# Patient Record
Sex: MALE | Race: WHITE | HISPANIC OR LATINO | Employment: UNEMPLOYED
[De-identification: names, ages, dates, MRNs, and addresses within clinical notes are randomized per-mention and may not be internally consistent; named-entity substitution may affect disease eponyms.]

---

## 2018-04-24 PROCEDURE — 88305 TISSUE EXAM BY PATHOLOGIST: CPT | Performed by: INTERNAL MEDICINE

## 2018-08-30 ENCOUNTER — CHARTING TRANS (OUTPATIENT)
Dept: OTHER | Age: 78
End: 2018-08-30

## 2018-10-11 ENCOUNTER — CHARTING TRANS (OUTPATIENT)
Dept: OTHER | Age: 78
End: 2018-10-11

## 2018-12-01 ENCOUNTER — PRIOR ORIGINAL RECORDS (OUTPATIENT)
Dept: HEALTH INFORMATION MANAGEMENT | Facility: OTHER | Age: 78
End: 2018-12-01

## 2018-12-08 VITALS
SYSTOLIC BLOOD PRESSURE: 124 MMHG | RESPIRATION RATE: 20 BRPM | HEART RATE: 62 BPM | DIASTOLIC BLOOD PRESSURE: 66 MMHG | WEIGHT: 120.38 LBS | TEMPERATURE: 97.6 F

## 2018-12-08 VITALS — TEMPERATURE: 97.5 F

## 2019-01-05 ENCOUNTER — TELEPHONE (OUTPATIENT)
Dept: SCHEDULING | Age: 79
End: 2019-01-05

## 2019-01-05 ENCOUNTER — OFFICE VISIT (OUTPATIENT)
Dept: INTERNAL MEDICINE | Age: 79
End: 2019-01-05

## 2019-01-05 DIAGNOSIS — Z90.3 HISTORY OF GASTRECTOMY: ICD-10-CM

## 2019-01-05 DIAGNOSIS — G30.9 ALZHEIMER'S DEMENTIA WITHOUT BEHAVIORAL DISTURBANCE, UNSPECIFIED TIMING OF DEMENTIA ONSET: ICD-10-CM

## 2019-01-05 DIAGNOSIS — R07.89 ANTERIOR CHEST WALL PAIN: Primary | ICD-10-CM

## 2019-01-05 DIAGNOSIS — F02.80 ALZHEIMER'S DEMENTIA WITHOUT BEHAVIORAL DISTURBANCE, UNSPECIFIED TIMING OF DEMENTIA ONSET: ICD-10-CM

## 2019-01-05 PROCEDURE — 99204 OFFICE O/P NEW MOD 45 MIN: CPT | Performed by: INTERNAL MEDICINE

## 2019-01-05 RX ORDER — MEMANTINE HYDROCHLORIDE 10 MG/1
10 TABLET ORAL 2 TIMES DAILY
COMMUNITY

## 2019-01-05 RX ORDER — DICLOFENAC SODIUM 75 MG/1
75 TABLET, DELAYED RELEASE ORAL 2 TIMES DAILY
Qty: 28 TABLET | Refills: 1 | Status: SHIPPED | OUTPATIENT
Start: 2019-01-05 | End: 2019-02-06 | Stop reason: SDUPTHER

## 2019-01-05 RX ORDER — GALANTAMINE HYDROBROMIDE 16 MG/1
16 CAPSULE, EXTENDED RELEASE ORAL
COMMUNITY

## 2019-01-05 ASSESSMENT — ENCOUNTER SYMPTOMS
RHINORRHEA: 0
DIZZINESS: 1
NAUSEA: 0
CONFUSION: 1
WOUND: 0
CHILLS: 0
SHORTNESS OF BREATH: 0
SORE THROAT: 0
NERVOUS/ANXIOUS: 0
ABDOMINAL PAIN: 0
WHEEZING: 0
FEVER: 0
BACK PAIN: 0
HEADACHES: 0
SLEEP DISTURBANCE: 0
POLYDIPSIA: 0
APPETITE CHANGE: 0
CONSTIPATION: 0
BRUISES/BLEEDS EASILY: 0
TROUBLE SWALLOWING: 0
FATIGUE: 0
VOMITING: 0
COUGH: 0
DIARRHEA: 0
BLOOD IN STOOL: 0

## 2019-01-05 ASSESSMENT — PAIN SCALES - GENERAL: PAINLEVEL: 0

## 2019-02-05 ENCOUNTER — TELEPHONE (OUTPATIENT)
Dept: SCHEDULING | Age: 79
End: 2019-02-05

## 2019-02-06 ENCOUNTER — HOSPITAL (OUTPATIENT)
Dept: OTHER | Age: 79
End: 2019-02-06
Attending: INTERNAL MEDICINE

## 2019-02-06 ENCOUNTER — OFFICE VISIT (OUTPATIENT)
Dept: INTERNAL MEDICINE | Age: 79
End: 2019-02-06

## 2019-02-06 VITALS
TEMPERATURE: 97.4 F | SYSTOLIC BLOOD PRESSURE: 110 MMHG | HEART RATE: 60 BPM | BODY MASS INDEX: 18.25 KG/M2 | HEIGHT: 69 IN | WEIGHT: 123.24 LBS | DIASTOLIC BLOOD PRESSURE: 70 MMHG | OXYGEN SATURATION: 96 %

## 2019-02-06 DIAGNOSIS — M25.511 ARTHRALGIA OF RIGHT ACROMIOCLAVICULAR JOINT: Primary | ICD-10-CM

## 2019-02-06 PROCEDURE — 99214 OFFICE O/P EST MOD 30 MIN: CPT | Performed by: INTERNAL MEDICINE

## 2019-02-06 RX ORDER — DICLOFENAC SODIUM 75 MG/1
75 TABLET, DELAYED RELEASE ORAL 2 TIMES DAILY
Qty: 28 TABLET | Refills: 1 | Status: SHIPPED | OUTPATIENT
Start: 2019-02-06

## 2019-02-06 RX ORDER — VITAMIN E 268 MG
400 CAPSULE ORAL DAILY
COMMUNITY

## 2019-02-06 RX ORDER — TURMERIC 400 MG
CAPSULE ORAL
COMMUNITY
End: 2019-06-06 | Stop reason: ALTCHOICE

## 2019-02-06 ASSESSMENT — ENCOUNTER SYMPTOMS
ABDOMINAL PAIN: 0
FEVER: 0
CHEST TIGHTNESS: 0

## 2019-03-09 ENCOUNTER — DIAGNOSTIC TRANS (OUTPATIENT)
Dept: OTHER | Age: 79
End: 2019-03-09

## 2019-03-10 ENCOUNTER — HOSPITAL (OUTPATIENT)
Dept: OTHER | Age: 79
End: 2019-03-10
Attending: INTERNAL MEDICINE

## 2019-03-10 ENCOUNTER — HOSPITAL (OUTPATIENT)
Dept: OTHER | Age: 79
End: 2019-03-10

## 2019-03-10 LAB
2009 H1N1 SUBTYPE (RF1N1): NOT DETECTED
ADENOVIRUS (RADENO): NOT DETECTED
ALBUMIN SERPL-MCNC: 3.6 GM/DL (ref 3.6–5.1)
ALP SERPL-CCNC: 79 UNIT/L (ref 45–117)
ALT SERPL-CCNC: 30 UNIT/L
AMORPH SED URNS QL MICRO: ABNORMAL
ANALYZER ANC (IANC): ABNORMAL
ANION GAP SERPL CALC-SCNC: 14 MMOL/L (ref 10–20)
APPEARANCE UR: CLEAR
AST SERPL-CCNC: 44 UNIT/L
BACTERIA #/AREA URNS HPF: ABNORMAL /HPF
BASOPHILS # BLD: 0 THOUSAND/MCL (ref 0–0.3)
BASOPHILS NFR BLD: 0 %
BILIRUB CONJ SERPL-MCNC: 0.1 MG/DL (ref 0–0.2)
BILIRUB SERPL-MCNC: 0.5 MG/DL (ref 0.2–1)
BILIRUB UR QL: NEGATIVE
BOCAVIRUS (RBOCA): NOT DETECTED
BUN SERPL-MCNC: 20 MG/DL (ref 6–20)
BUN/CREAT SERPL: 21 (ref 7–25)
C. PNEUMONIAE (RCHLP): NOT DETECTED
CALCIUM SERPL-MCNC: 8.9 MG/DL (ref 8.4–10.2)
CAOX CRY URNS QL MICRO: ABNORMAL
CHLORIDE: 106 MMOL/L (ref 98–107)
CO2 SERPL-SCNC: 27 MMOL/L (ref 21–32)
COLOR UR: YELLOW
CORONAVIRUS 229E (RC229E): NOT DETECTED
CORONAVIRUS HKU1 (RCHKU1): NOT DETECTED
CORONAVIRUS NL63 (RCNL63): NOT DETECTED
CORONAVIRUS OC43 (RCO43): NOT DETECTED
CREAT SERPL-MCNC: 0.96 MG/DL (ref 0.67–1.17)
DIFFERENTIAL METHOD BLD: ABNORMAL
EOSINOPHIL # BLD: 0 THOUSAND/MCL (ref 0.1–0.5)
EOSINOPHIL NFR BLD: 0 %
EPITH CASTS #/AREA URNS LPF: ABNORMAL /[LPF]
ERYTHROCYTE [DISTWIDTH] IN BLOOD: 12.2 % (ref 11–15)
FATTY CASTS #/AREA URNS LPF: ABNORMAL /[LPF]
GLUCOSE SERPL-MCNC: 86 MG/DL (ref 65–99)
GLUCOSE UR-MCNC: NEGATIVE MG/DL
GRAN CASTS #/AREA URNS LPF: ABNORMAL /[LPF]
HEMATOCRIT: 42.1 % (ref 39–51)
HGB BLD-MCNC: 14.2 GM/DL (ref 13–17)
HGB UR QL: NEGATIVE
HYALINE CASTS #/AREA URNS LPF: ABNORMAL /LPF (ref 0–5)
IMM GRANULOCYTES # BLD AUTO: 0 THOUSAND/MCL (ref 0–0.2)
IMM GRANULOCYTES NFR BLD: 0 %
INFLUENZA A SUBTYPE H1 (RFLH1): NOT DETECTED
INFLUENZA A SUBTYPE H3 (RFLH3): POSITIVE
INFLUENZA A UNSUBTYPABLE (RIAU): ABNORMAL
INFLUENZA B VIRUS (XFLUB): NOT DETECTED
KETONES UR-MCNC: 20 MG/DL
LEUKOCYTE ESTERASE UR QL STRIP: NEGATIVE
LIPASE SERPL-CCNC: 183 UNIT/L (ref 73–393)
LYMPHOCYTES # BLD: 1.4 THOUSAND/MCL (ref 1–4)
LYMPHOCYTES NFR BLD: 20 %
M. PNEUMONIAE (RMYPP): NOT DETECTED
MAGNESIUM SERPL-MCNC: 2.1 MG/DL (ref 1.7–2.4)
MCH RBC QN AUTO: 32.3 PG (ref 26–34)
MCHC RBC AUTO-ENTMCNC: 33.7 GM/DL (ref 32–36.5)
MCV RBC AUTO: 95.9 FL (ref 78–100)
METAPNEUMOVIRUS (RMETA): NOT DETECTED
MIXED CELL CASTS #/AREA URNS LPF: ABNORMAL /[LPF]
MONOCYTES # BLD: 0.5 THOUSAND/MCL (ref 0.3–0.9)
MONOCYTES NFR BLD: 7 %
MUCOUS THREADS URNS QL MICRO: PRESENT
NEUTROPHILS # BLD: 5.3 THOUSAND/MCL (ref 1.8–7.7)
NEUTROPHILS NFR BLD: 73 %
NEUTS SEG NFR BLD: ABNORMAL %
NITRITE UR QL: NEGATIVE
NRBC (NRBCRE): 0 /100 WBC
PARAINFLUENZA, TYPE 1 (RPAR1): NOT DETECTED
PARAINFLUENZA, TYPE 2 (RPAR2): NOT DETECTED
PARAINFLUENZA, TYPE 3 (RPAR3): NOT DETECTED
PARAINFLUENZA, TYPE 4 (RPAR4): NOT DETECTED
PH UR: 6 UNIT (ref 5–7)
PLATELET # BLD: 135 THOUSAND/MCL (ref 140–450)
POTASSIUM SERPL-SCNC: 3.7 MMOL/L (ref 3.4–5.1)
PROCALCITONIN SERPL IA-MCNC: <0.05 NG/ML
PROT SERPL-MCNC: 7.6 GM/DL (ref 6.4–8.2)
PROT UR QL: NEGATIVE MG/DL
RBC # BLD: 4.39 MILLION/MCL (ref 4.5–5.9)
RBC #/AREA URNS HPF: ABNORMAL /HPF (ref 0–3)
RBC CASTS #/AREA URNS LPF: ABNORMAL /[LPF]
RENAL EPI CELLS #/AREA URNS HPF: ABNORMAL /[HPF]
RHINOVIRUS/ENTEROVIRUS (RRHINO): NOT DETECTED
RSV, SUBTYPE A (RRSVA): NOT DETECTED
RSV, SUBTYPE B (RRSVB): NOT DETECTED
SODIUM SERPL-SCNC: 143 MMOL/L (ref 135–145)
SP GR UR: 1.03 (ref 1–1.03)
SPECIMEN SOURCE: ABNORMAL
SPECIMEN SOURCE: ABNORMAL
SPERM URNS QL MICRO: ABNORMAL
SQUAMOUS #/AREA URNS HPF: ABNORMAL /HPF (ref 0–5)
T VAGINALIS URNS QL MICRO: ABNORMAL
TRI-PHOS CRY URNS QL MICRO: ABNORMAL
TROPONIN I SERPL HS-MCNC: <0.02 NG/ML
TSH SERPL-ACNC: 1.94 MCUNIT/ML (ref 0.35–5)
URATE CRY URNS QL MICRO: ABNORMAL
URINE REFLEX: ABNORMAL
URNS CMNT MICRO: ABNORMAL
UROBILINOGEN UR QL: 0.2 MG/DL (ref 0–1)
WAXY CASTS #/AREA URNS LPF: ABNORMAL /[LPF]
WBC # BLD: 7.3 THOUSAND/MCL (ref 4.2–11)
WBC #/AREA URNS HPF: ABNORMAL /HPF (ref 0–5)
WBC CASTS #/AREA URNS LPF: ABNORMAL /[LPF]
YEAST HYPHAE URNS QL MICRO: ABNORMAL
YEAST URNS QL MICRO: ABNORMAL

## 2019-03-11 LAB
CREAT SERPL-MCNC: 0.85 MG/DL (ref 0.67–1.17)
MAGNESIUM SERPL-MCNC: 1.8 MG/DL (ref 1.7–2.4)
POTASSIUM SERPL-SCNC: 3.7 MMOL/L (ref 3.4–5.1)

## 2019-03-13 LAB
CREAT SERPL-MCNC: 0.83 MG/DL (ref 0.67–1.17)
FOLATE SERPL-MCNC: 9.7 NG/ML
IRON SATN MFR SERPL: 25 % (ref 15–45)
IRON SERPL-MCNC: 55 MCG/DL (ref 65–175)
POTASSIUM SERPL-SCNC: 3.6 MMOL/L (ref 3.4–5.1)
TIBC SERPL-MCNC: 224 MCG/DL (ref 250–450)
VIT B12 SERPL-MCNC: 432 PG/ML (ref 211–911)

## 2019-03-14 LAB
CREAT SERPL-MCNC: 0.95 MG/DL (ref 0.67–1.17)
POTASSIUM SERPL-SCNC: 3.8 MMOL/L (ref 3.4–5.1)

## 2019-03-15 ENCOUNTER — TELEPHONE (OUTPATIENT)
Dept: SCHEDULING | Age: 79
End: 2019-03-15

## 2019-06-05 ENCOUNTER — TELEPHONE (OUTPATIENT)
Dept: SCHEDULING | Age: 79
End: 2019-06-05

## 2019-06-06 ENCOUNTER — OFFICE VISIT (OUTPATIENT)
Dept: INTERNAL MEDICINE | Age: 79
End: 2019-06-06

## 2019-06-06 DIAGNOSIS — G47.62 NOCTURNAL LEG CRAMPS: ICD-10-CM

## 2019-06-06 DIAGNOSIS — R42 DIZZINESS: Primary | ICD-10-CM

## 2019-06-06 PROCEDURE — 99214 OFFICE O/P EST MOD 30 MIN: CPT | Performed by: INTERNAL MEDICINE

## 2019-06-06 RX ORDER — MECLIZINE HCL 12.5 MG/1
12.5 TABLET ORAL 3 TIMES DAILY PRN
Qty: 30 TABLET | Refills: 2 | Status: SHIPPED | OUTPATIENT
Start: 2019-06-06 | End: 2019-07-11 | Stop reason: SDUPTHER

## 2019-06-06 ASSESSMENT — ENCOUNTER SYMPTOMS
DIARRHEA: 0
APPETITE CHANGE: 0
COUGH: 0
NAUSEA: 0
VOMITING: 0
UNEXPECTED WEIGHT CHANGE: 0
SLEEP DISTURBANCE: 0
BACK PAIN: 0
ABDOMINAL DISTENTION: 0
CHILLS: 0
HEADACHES: 0
DIZZINESS: 1
SHORTNESS OF BREATH: 0
CONSTIPATION: 0
FATIGUE: 0
BLOOD IN STOOL: 0
FEVER: 0

## 2019-06-06 ASSESSMENT — PAIN SCALES - GENERAL: PAINLEVEL: 9-10

## 2019-07-08 ENCOUNTER — HOSPITAL (OUTPATIENT)
Dept: OTHER | Age: 79
End: 2019-07-08

## 2019-07-08 ENCOUNTER — DIAGNOSTIC TRANS (OUTPATIENT)
Dept: OTHER | Age: 79
End: 2019-07-08

## 2019-07-08 LAB
ANALYZER ANC (IANC): ABNORMAL
ANION GAP SERPL CALC-SCNC: 7 MMOL/L (ref 10–20)
BASOPHILS # BLD: 0 K/MCL (ref 0–0.3)
BASOPHILS NFR BLD: 0 %
BUN SERPL-MCNC: 16 MG/DL (ref 6–20)
BUN/CREAT SERPL: 15 (ref 7–25)
CALCIUM SERPL-MCNC: 9.1 MG/DL (ref 8.4–10.2)
CHLORIDE SERPL-SCNC: 109 MMOL/L (ref 98–107)
CO2 SERPL-SCNC: 30 MMOL/L (ref 21–32)
CREAT SERPL-MCNC: 1.07 MG/DL (ref 0.67–1.17)
DIFFERENTIAL METHOD BLD: ABNORMAL
EOSINOPHIL # BLD: 0.1 K/MCL (ref 0.1–0.5)
EOSINOPHIL NFR BLD: 1 %
ERYTHROCYTE [DISTWIDTH] IN BLOOD: 12.8 % (ref 11–15)
GLUCOSE SERPL-MCNC: 88 MG/DL (ref 65–99)
HCT VFR BLD CALC: 41.7 % (ref 39–51)
HGB BLD-MCNC: 14 G/DL (ref 13–17)
IMM GRANULOCYTES # BLD AUTO: 0 K/MCL (ref 0–0.2)
IMM GRANULOCYTES NFR BLD: 0 %
LYMPHOCYTES # BLD: 2.2 K/MCL (ref 1–4)
LYMPHOCYTES NFR BLD: 38 %
MCH RBC QN AUTO: 32.9 PG (ref 26–34)
MCHC RBC AUTO-ENTMCNC: 33.6 G/DL (ref 32–36.5)
MCV RBC AUTO: 97.9 FL (ref 78–100)
MONOCYTES # BLD: 0.4 K/MCL (ref 0.3–0.9)
MONOCYTES NFR BLD: 7 %
NEUTROPHILS # BLD: 3.1 K/MCL (ref 1.8–7.7)
NEUTROPHILS NFR BLD: 54 %
NEUTS SEG NFR BLD: ABNORMAL %
NRBC (NRBCRE): 0 /100 WBC
PLATELET # BLD: 164 K/MCL (ref 140–450)
POTASSIUM SERPL-SCNC: 3.9 MMOL/L (ref 3.4–5.1)
POTASSIUM SERPL-SCNC: ABNORMAL MMOL/L
RBC # BLD: 4.26 MIL/MCL (ref 4.5–5.9)
SODIUM SERPL-SCNC: 142 MMOL/L (ref 135–145)
TROPONIN I SERPL HS-MCNC: <0.02 NG/ML
WBC # BLD: 5.7 K/MCL (ref 4.2–11)

## 2019-07-11 DIAGNOSIS — R42 DIZZINESS: ICD-10-CM

## 2019-07-11 RX ORDER — MECLIZINE HCL 12.5 MG/1
TABLET ORAL
Qty: 30 TABLET | Refills: 0 | Status: SHIPPED | OUTPATIENT
Start: 2019-07-11 | End: 2019-07-23 | Stop reason: SDUPTHER

## 2019-07-15 ENCOUNTER — HOSPITAL ENCOUNTER (EMERGENCY)
Facility: HOSPITAL | Age: 79
Discharge: LEFT BEFORE DISCHARGE | End: 2019-07-15
Payer: COMMERCIAL

## 2019-07-23 DIAGNOSIS — R42 DIZZINESS: ICD-10-CM

## 2019-07-23 RX ORDER — MECLIZINE HCL 12.5 MG/1
TABLET ORAL
Qty: 30 TABLET | Refills: 0 | Status: SHIPPED | OUTPATIENT
Start: 2019-07-23

## 2020-02-23 ENCOUNTER — HOSPITAL ENCOUNTER (OUTPATIENT)
Facility: HOSPITAL | Age: 80
Setting detail: OBSERVATION
Discharge: HOME | End: 2020-02-24
Attending: EMERGENCY MEDICINE | Admitting: HOSPITALIST
Payer: COMMERCIAL

## 2020-02-23 ENCOUNTER — APPOINTMENT (OUTPATIENT)
Dept: CT IMAGING | Facility: HOSPITAL | Age: 80
End: 2020-02-23
Attending: EMERGENCY MEDICINE
Payer: COMMERCIAL

## 2020-02-23 DIAGNOSIS — R42 DIZZINESS: Primary | ICD-10-CM

## 2020-02-23 DIAGNOSIS — R00.1 BRADYCARDIA: ICD-10-CM

## 2020-02-23 PROBLEM — D69.6 THROMBOCYTOPENIA (HCC): Status: ACTIVE | Noted: 2020-02-23

## 2020-02-23 PROBLEM — R73.9 HYPERGLYCEMIA: Status: ACTIVE | Noted: 2020-02-23

## 2020-02-23 LAB
ALBUMIN SERPL-MCNC: 3.4 G/DL (ref 3.4–5)
ALBUMIN/GLOB SERPL: 1 {RATIO} (ref 1–2)
ALP LIVER SERPL-CCNC: 76 U/L (ref 45–117)
ALT SERPL-CCNC: 31 U/L (ref 16–61)
ANION GAP SERPL CALC-SCNC: 4 MMOL/L (ref 0–18)
AST SERPL-CCNC: 26 U/L (ref 15–37)
ATRIAL RATE: 49 BPM
BASOPHILS # BLD AUTO: 0.01 X10(3) UL (ref 0–0.2)
BASOPHILS NFR BLD AUTO: 0.2 %
BILIRUB SERPL-MCNC: 0.6 MG/DL (ref 0.1–2)
BUN BLD-MCNC: 17 MG/DL (ref 7–18)
BUN/CREAT SERPL: 16.7 (ref 10–20)
CALCIUM BLD-MCNC: 8.6 MG/DL (ref 8.5–10.1)
CHLORIDE SERPL-SCNC: 110 MMOL/L (ref 98–112)
CO2 SERPL-SCNC: 26 MMOL/L (ref 21–32)
CREAT BLD-MCNC: 1.02 MG/DL (ref 0.7–1.3)
DEPRECATED RDW RBC AUTO: 44.8 FL (ref 35.1–46.3)
EOSINOPHIL # BLD AUTO: 0.03 X10(3) UL (ref 0–0.7)
EOSINOPHIL NFR BLD AUTO: 0.6 %
ERYTHROCYTE [DISTWIDTH] IN BLOOD BY AUTOMATED COUNT: 12.5 % (ref 11–15)
GLOBULIN PLAS-MCNC: 3.4 G/DL (ref 2.8–4.4)
GLUCOSE BLD-MCNC: 132 MG/DL (ref 70–99)
GLUCOSE BLD-MCNC: 92 MG/DL (ref 70–99)
HAV IGM SER QL: 2.1 MG/DL (ref 1.6–2.6)
HCT VFR BLD AUTO: 38.5 % (ref 39–53)
HGB BLD-MCNC: 12.6 G/DL (ref 13–17.5)
IMM GRANULOCYTES # BLD AUTO: 0.01 X10(3) UL (ref 0–1)
IMM GRANULOCYTES NFR BLD: 0.2 %
LYMPHOCYTES # BLD AUTO: 1.04 X10(3) UL (ref 1–4)
LYMPHOCYTES NFR BLD AUTO: 21.9 %
M PROTEIN MFR SERPL ELPH: 6.8 G/DL (ref 6.4–8.2)
MCH RBC QN AUTO: 32 PG (ref 26–34)
MCHC RBC AUTO-ENTMCNC: 32.7 G/DL (ref 31–37)
MCV RBC AUTO: 97.7 FL (ref 80–100)
MONOCYTES # BLD AUTO: 0.23 X10(3) UL (ref 0.1–1)
MONOCYTES NFR BLD AUTO: 4.8 %
NEUTROPHILS # BLD AUTO: 3.43 X10 (3) UL (ref 1.5–7.7)
NEUTROPHILS # BLD AUTO: 3.43 X10(3) UL (ref 1.5–7.7)
NEUTROPHILS NFR BLD AUTO: 72.3 %
OSMOLALITY SERPL CALC.SUM OF ELEC: 293 MOSM/KG (ref 275–295)
P AXIS: 82 DEGREES
P-R INTERVAL: 162 MS
PLATELET # BLD AUTO: 139 10(3)UL (ref 150–450)
POTASSIUM SERPL-SCNC: 4 MMOL/L (ref 3.5–5.1)
Q-T INTERVAL: 466 MS
QRS DURATION: 84 MS
QTC CALCULATION (BEZET): 420 MS
R AXIS: 66 DEGREES
RBC # BLD AUTO: 3.94 X10(6)UL (ref 3.8–5.8)
SODIUM SERPL-SCNC: 140 MMOL/L (ref 136–145)
T AXIS: 82 DEGREES
TROPONIN I SERPL-MCNC: <0.045 NG/ML (ref ?–0.04)
TSI SER-ACNC: 1.08 MIU/ML (ref 0.36–3.74)
VENTRICULAR RATE: 49 BPM
WBC # BLD AUTO: 4.8 X10(3) UL (ref 4–11)

## 2020-02-23 PROCEDURE — 99220 INITIAL OBSERVATION CARE,LEVL III: CPT | Performed by: HOSPITALIST

## 2020-02-23 PROCEDURE — 70450 CT HEAD/BRAIN W/O DYE: CPT | Performed by: EMERGENCY MEDICINE

## 2020-02-23 PROCEDURE — 72125 CT NECK SPINE W/O DYE: CPT | Performed by: EMERGENCY MEDICINE

## 2020-02-23 RX ORDER — SODIUM CHLORIDE 9 MG/ML
INJECTION, SOLUTION INTRAVENOUS CONTINUOUS
Status: DISCONTINUED | OUTPATIENT
Start: 2020-02-23 | End: 2020-02-23

## 2020-02-23 RX ORDER — ONDANSETRON 2 MG/ML
4 INJECTION INTRAMUSCULAR; INTRAVENOUS EVERY 6 HOURS PRN
Status: DISCONTINUED | OUTPATIENT
Start: 2020-02-23 | End: 2020-02-24

## 2020-02-23 RX ORDER — ACETAMINOPHEN 325 MG/1
650 TABLET ORAL EVERY 6 HOURS PRN
Status: DISCONTINUED | OUTPATIENT
Start: 2020-02-23 | End: 2020-02-23

## 2020-02-23 RX ORDER — SODIUM CHLORIDE 9 MG/ML
INJECTION, SOLUTION INTRAVENOUS ONCE
Status: COMPLETED | OUTPATIENT
Start: 2020-02-23 | End: 2020-02-24

## 2020-02-23 RX ORDER — GALANTAMINE HYDROBROMIDE 8 MG/1
8 TABLET, FILM COATED ORAL 2 TIMES DAILY WITH MEALS
Status: DISCONTINUED | OUTPATIENT
Start: 2020-02-23 | End: 2020-02-24

## 2020-02-23 RX ORDER — MEMANTINE HYDROCHLORIDE 10 MG/1
10 TABLET ORAL 2 TIMES DAILY
COMMUNITY
End: 2021-11-03

## 2020-02-23 RX ORDER — METOCLOPRAMIDE HYDROCHLORIDE 5 MG/ML
5 INJECTION INTRAMUSCULAR; INTRAVENOUS EVERY 8 HOURS PRN
Status: DISCONTINUED | OUTPATIENT
Start: 2020-02-23 | End: 2020-02-24

## 2020-02-23 RX ORDER — GALANTAMINE HYDROBROMIDE 16 MG/1
16 CAPSULE, EXTENDED RELEASE ORAL DAILY
Status: ON HOLD | COMMUNITY
End: 2020-02-24

## 2020-02-23 RX ORDER — MEMANTINE HYDROCHLORIDE 10 MG/1
10 TABLET ORAL 2 TIMES DAILY
Status: DISCONTINUED | OUTPATIENT
Start: 2020-02-23 | End: 2020-02-24

## 2020-02-23 RX ORDER — MECLIZINE HCL 12.5 MG/1
12.5 TABLET ORAL EVERY 6 HOURS PRN
Status: DISCONTINUED | OUTPATIENT
Start: 2020-02-23 | End: 2020-02-24

## 2020-02-23 RX ORDER — ACETAMINOPHEN 325 MG/1
650 TABLET ORAL EVERY 6 HOURS PRN
Status: DISCONTINUED | OUTPATIENT
Start: 2020-02-23 | End: 2020-02-24

## 2020-02-23 RX ORDER — ASPIRIN 81 MG/1
324 TABLET, CHEWABLE ORAL ONCE
Status: COMPLETED | OUTPATIENT
Start: 2020-02-23 | End: 2020-02-23

## 2020-02-23 RX ORDER — MELATONIN
325
Status: DISCONTINUED | OUTPATIENT
Start: 2020-02-24 | End: 2020-02-24

## 2020-02-23 NOTE — H&P
JULIUS HOSPITALIST  History and Physical     America Pals Patient Status:  Emergency    1940 MRN [id number]   Location [facility] Attending Yanni Vieyra MD   Hosp Day # 0 PCP Mohini Krueger MD     Chief Complaint: Allergies: No Known Allergies    Medications:  No current facility-administered medications on file prior to encounter. Galantamine Hydrobromide ER 16 MG Oral Capsule SR 24 Hr, Take 16 mg by mouth daily. , Disp: , Rfl:   Memantine HCl 10 MG Oral Tab Clearance: 43.1 mL/min (based on SCr of 1.02 mg/dL). No results for input(s): PTP, INR in the last 168 hours. Recent Labs   Lab 02/23/20  1341   TROP <0.045       Imaging: Imaging data reviewed in Epic. ASSESSMENT / PLAN:     1. Near syncope  1.

## 2020-02-23 NOTE — ED PROVIDER NOTES
Patient Seen in: [facility] Emergency Department      History   Patient presents with:  Dizziness    Stated Complaint: dizziness    HPI    78-year-old with a history of Alzheimer's disease, previous gastric cancer status post gastrectomy, chronic an POLYPECTOMY 03241 N/A 4/24/2018    Performed by Quincy Burr MD at [facility]   • COLONOSCOPY, POSSIBLE BIOPSY, POSSIBLE POLYPECTOMY 90632 N/A 4/16/2018    Performed by Quincy Burr MD at [address] [place] [address],[facility] (14) - Abnormal; Notable for the following components:       Result Value    Glucose 132 (*)     All other components within normal limits   CBC W/ DIFFERENTIAL - Abnormal; Notable for the following components:    HGB 12.6 (*)     HCT 38.5 (*)     . Discharge Medication List                   Present on Admission           ICD-10-CM Noted POA    * (Principal) Dizziness R42 2/23/2020 Unknown    Bradycardia R00.1 2/23/2020     Dehydration E86.0 Unknown Unknown    Hyperglycemia R73.9 2/23/2020 Yes    Thr
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
## 2020-02-24 ENCOUNTER — APPOINTMENT (OUTPATIENT)
Dept: MRI IMAGING | Facility: HOSPITAL | Age: 80
End: 2020-02-24
Attending: HOSPITALIST
Payer: COMMERCIAL

## 2020-02-24 VITALS
TEMPERATURE: 98 F | HEART RATE: 50 BPM | SYSTOLIC BLOOD PRESSURE: 103 MMHG | WEIGHT: 112.44 LBS | DIASTOLIC BLOOD PRESSURE: 59 MMHG | RESPIRATION RATE: 18 BRPM | BODY MASS INDEX: 16 KG/M2 | OXYGEN SATURATION: 97 %

## 2020-02-24 PROCEDURE — 99217 OBSERVATION CARE DISCHARGE: CPT | Performed by: HOSPITALIST

## 2020-02-24 PROCEDURE — 70546 MR ANGIOGRAPH HEAD W/O&W/DYE: CPT | Performed by: HOSPITALIST

## 2020-02-24 PROCEDURE — 70549 MR ANGIOGRAPH NECK W/O&W/DYE: CPT | Performed by: HOSPITALIST

## 2020-02-24 PROCEDURE — 70553 MRI BRAIN STEM W/O & W/DYE: CPT | Performed by: HOSPITALIST

## 2020-02-24 RX ORDER — ASPIRIN 81 MG/1
81 TABLET, CHEWABLE ORAL NIGHTLY
Refills: 0 | Status: SHIPPED | COMMUNITY
Start: 2020-02-24

## 2020-02-24 RX ORDER — MECLIZINE HCL 12.5 MG/1
12.5 TABLET ORAL EVERY 6 HOURS PRN
Qty: 20 TABLET | Refills: 0 | Status: SHIPPED | OUTPATIENT
Start: 2020-02-24

## 2020-02-24 RX ORDER — GALANTAMINE HYDROBROMIDE 8 MG/1
16 CAPSULE, EXTENDED RELEASE ORAL
Qty: 30 CAPSULE | Refills: 0 | Status: SHIPPED | COMMUNITY
Start: 2020-02-24 | End: 2021-11-03

## 2020-02-24 RX ORDER — GALANTAMINE HYDROBROMIDE 4 MG/1
4 TABLET, FILM COATED ORAL 2 TIMES DAILY WITH MEALS
Status: DISCONTINUED | OUTPATIENT
Start: 2020-02-24 | End: 2020-02-24

## 2020-02-24 RX ORDER — GALANTAMINE HYDROBROMIDE 8 MG/1
8 CAPSULE, EXTENDED RELEASE ORAL
Qty: 30 CAPSULE | Refills: 0 | Status: SHIPPED | OUTPATIENT
Start: 2020-02-24 | End: 2020-02-24

## 2020-02-24 NOTE — PHYSICAL THERAPY NOTE
PHYSICAL THERAPY QUICK EVALUATION - INPATIENT    Room Number: 8295/9472-H  Evaluation Date: 2/24/2020  Presenting Problem: dizziness, bradycardia  Physician Order: PT Eval and Treat     History related to current admission: Pt is 80 yo male admitted with 12  Railing: Yes    Lives With: Spouse  Drives: No  Patient Owned Equipment: None  Patient Regularly Uses: None    Prior Level of [place]: Pt typically ind with self care and mobility per son report.     SUBJECTIVE  \"I'm not dizzy now\" per son interp Therapy Provided: pt presents supine in bed, agreeable to PT eval, son provides interpreting. Supine to sit with supervision, sit EOB without difficulty, sit to stand with supervision, no c/o dizziness in sitting or standing.  No nystagmus noted with positi this evaluation, patient's clinical presentation is stable and overall evaluation complexity is considered low. PT Discharge Recommendations: Home; Intermittent Supervision    PLAN  Patient has been evaluated and presents with no skilled Physical Therapy n

## 2020-02-24 NOTE — PLAN OF CARE
Patient A&O x4 but forgetful at times  Lungs clear  HR NS to SB. Down to 38 x1  Pt complaints of dizziness x2. Given meclizine both times and dizziness resolved. Down for MRI late in evening. MRI negative, some stenosis noted, MD added baby aspirin.    Sp

## 2020-02-24 NOTE — PROGRESS NOTES
JULIUS HOSPITALIST  Progress Note     America Pals Patient Status:  Observation    1940 MRN [id number]   [facility] 7NE-A Attending Mendez Castañeda MD   Hosp Day # 0 PCP Mohini Krueger MD     Chief Complaint: dizzy    S: Patient has blockers  3. Chronic and currently at his baseline  4. Right Lung nodule  1. Known issue  2. Follows with pulm as OP  5. Dementia  1. Progressing  2. SW eval  6. Hx of gastric CA and becky gastrectomy  7. GERD    Plan of care: as above.   On [place] 16 davon

## 2020-02-24 NOTE — PLAN OF CARE
NURSING ADMISSION NOTE      Patient admitted via Wheelchair  Oriented to room. Safety precautions initiated. Bed in low position. Call light in reach.     Received patient A/Ox3, RA, SB on tele at 1700  Patient is Mongolian speaking, accompanied by wif osmolarity and serum sodium as indicated or ordered  - Monitor response to interventions for patient's volume status, including labs, urine output, blood pressure (other measures as available)  - Encourage oral intake as appropriate  - Instruct patient on  as needed  - Communicate barriers and strategies to overcome with those who interact with patient  Outcome: Progressing

## 2020-02-25 NOTE — DISCHARGE SUMMARY
[facility] HOSPITALIST  DISCHARGE SUMMARY     [place] Smoker Patient Status:  Observation    1940 MRN [id number]   [facility] 7NE-A Attending No att. providers found   Hosp Day # 0 PCP Paul Grimes MD     Date of Admission: 2020  Da 29-58:  Moderate Risk of readmission after discharge from the hospital.    •     Discharge Medication List:     Discharge Medications      START taking these medications      Instructions Prescription details   Meclizine HCl 12.5 MG Tabs  Commonly known as: signs: Temp:  [98 °F (36.7 °C)-98.3 °F (36.8 °C)] 98.3 °F (36.8 °C)  Pulse:  [46-50] 50  Resp:  [18-21] 18  BP: (102-109)/(56-59) 103/59    Physical Exam:    General: No acute distress. Respiratory: Clear to auscultation bilaterally. No wheezes.  No rhon

## 2020-09-16 PROBLEM — H61.23 BILATERAL IMPACTED CERUMEN: Status: ACTIVE | Noted: 2020-09-16

## 2020-09-16 PROBLEM — H93.299 ABNORMAL AUDITORY PERCEPTION, UNSPECIFIED LATERALITY: Status: ACTIVE | Noted: 2020-09-16

## 2020-12-07 DIAGNOSIS — R13.10 DYSPHAGIA: Primary | ICD-10-CM

## 2020-12-07 DIAGNOSIS — Z01.812 PRE-PROCEDURAL LABORATORY EXAMINATION: Primary | ICD-10-CM

## 2020-12-09 DIAGNOSIS — Z01.812 ENCOUNTER FOR PREPROCEDURE SCREENING LABORATORY TESTING FOR COVID-19: Primary | ICD-10-CM

## 2020-12-09 DIAGNOSIS — Z11.52 ENCOUNTER FOR PREPROCEDURE SCREENING LABORATORY TESTING FOR COVID-19: Primary | ICD-10-CM

## 2020-12-12 ENCOUNTER — LAB SERVICES (OUTPATIENT)
Dept: LAB | Age: 80
End: 2020-12-12

## 2020-12-14 ENCOUNTER — HOSPITAL ENCOUNTER (OUTPATIENT)
Dept: GENERAL RADIOLOGY | Age: 80
Discharge: HOME | End: 2020-12-14
Attending: INTERNAL MEDICINE

## 2020-12-14 DIAGNOSIS — Z01.812 PRE-PROCEDURAL LABORATORY EXAMINATION: Primary | ICD-10-CM

## 2020-12-19 ENCOUNTER — LAB SERVICES (OUTPATIENT)
Dept: LAB | Age: 80
End: 2020-12-19

## 2020-12-19 LAB
SARS-COV-2 RNA RESP QL NAA+PROBE: NOT DETECTED
SERVICE CMNT-IMP: NORMAL
SERVICE CMNT-IMP: NORMAL

## 2020-12-19 PROCEDURE — U0003 INFECTIOUS AGENT DETECTION BY NUCLEIC ACID (DNA OR RNA); SEVERE ACUTE RESPIRATORY SYNDROME CORONAVIRUS 2 (SARS-COV-2) (CORONAVIRUS DISEASE [COVID-19]), AMPLIFIED PROBE TECHNIQUE, MAKING USE OF HIGH THROUGHPUT TECHNOLOGIES AS DESCRIBED BY CMS-2020-01-R: HCPCS | Performed by: INTERNAL MEDICINE

## 2020-12-21 ENCOUNTER — APPOINTMENT (OUTPATIENT)
Dept: INTERPRETER SERVICES | Age: 80
End: 2020-12-21

## 2020-12-21 ENCOUNTER — APPOINTMENT (OUTPATIENT)
Dept: REHABILITATION | Age: 80
End: 2020-12-21
Attending: INTERNAL MEDICINE

## 2020-12-21 ENCOUNTER — HOSPITAL ENCOUNTER (OUTPATIENT)
Dept: GENERAL RADIOLOGY | Age: 80
Discharge: HOME | End: 2020-12-21
Attending: INTERNAL MEDICINE

## 2020-12-21 ENCOUNTER — HOSPITAL ENCOUNTER (OUTPATIENT)
Dept: REHABILITATION | Age: 80
Discharge: STILL ADMITTED OR ADMINISTRATIVE | End: 2020-12-21
Attending: INTERNAL MEDICINE

## 2020-12-21 DIAGNOSIS — R13.10 DYSPHAGIA: ICD-10-CM

## 2020-12-21 PROCEDURE — 92611 MOTION FLUOROSCOPY/SWALLOW: CPT

## 2020-12-21 PROCEDURE — 74230 X-RAY XM SWLNG FUNCJ C+: CPT

## 2020-12-22 ENCOUNTER — TELEPHONE (OUTPATIENT)
Dept: SCHEDULING | Age: 80
End: 2020-12-22

## 2020-12-22 DIAGNOSIS — R13.10 DYSPHAGIA: Primary | ICD-10-CM

## 2020-12-29 ENCOUNTER — HOSPITAL ENCOUNTER (OUTPATIENT)
Dept: REHABILITATION | Age: 80
Discharge: STILL ADMITTED OR ADMINISTRATIVE | End: 2020-12-29
Attending: INTERNAL MEDICINE

## 2020-12-29 PROCEDURE — 92610 EVALUATE SWALLOWING FUNCTION: CPT

## 2021-01-07 ENCOUNTER — HOSPITAL ENCOUNTER (OUTPATIENT)
Dept: REHABILITATION | Age: 81
Discharge: STILL ADMITTED OR ADMINISTRATIVE | End: 2021-01-07
Attending: INTERNAL MEDICINE

## 2021-01-07 PROCEDURE — 92526 ORAL FUNCTION THERAPY: CPT

## 2021-04-22 ENCOUNTER — APPOINTMENT (OUTPATIENT)
Dept: INTERPRETER SERVICES | Age: 81
End: 2021-04-22

## 2021-04-22 ENCOUNTER — HOSPITAL ENCOUNTER (OUTPATIENT)
Dept: LAB | Age: 81
Discharge: HOME | End: 2021-04-22
Attending: INTERNAL MEDICINE

## 2021-04-22 DIAGNOSIS — Z11.1 SCREENING-PULMONARY TB: Primary | ICD-10-CM

## 2021-04-22 DIAGNOSIS — Z11.1 SCREENING-PULMONARY TB: ICD-10-CM

## 2021-04-22 PROCEDURE — 36415 COLL VENOUS BLD VENIPUNCTURE: CPT | Performed by: CLINICAL MEDICAL LABORATORY

## 2021-04-22 PROCEDURE — 86480 TB TEST CELL IMMUN MEASURE: CPT | Performed by: CLINICAL MEDICAL LABORATORY

## 2021-04-24 LAB
GAMMA INTERFERON BACKGROUND BLD IA-ACNC: 0.22 IU/ML
M TB IFN-G BLD-IMP: NEGATIVE
M TB IFN-G CD4+ BCKGRND COR BLD-ACNC: 0 IU/ML
M TB IFN-G CD4+CD8+ BCKGRND COR BLD-ACNC: 0.05 IU/ML
MITOGEN IGNF BCKGRD COR BLD-ACNC: 8.46 IU/ML

## 2021-05-26 VITALS
WEIGHT: 121.25 LBS | BODY MASS INDEX: 17.96 KG/M2 | BODY MASS INDEX: 17.31 KG/M2 | HEIGHT: 69 IN | TEMPERATURE: 98.1 F | HEIGHT: 69 IN | SYSTOLIC BLOOD PRESSURE: 121 MMHG | OXYGEN SATURATION: 98 % | TEMPERATURE: 98.3 F | RESPIRATION RATE: 17 BRPM | WEIGHT: 116.84 LBS | SYSTOLIC BLOOD PRESSURE: 107 MMHG | HEART RATE: 90 BPM | RESPIRATION RATE: 17 BRPM | HEART RATE: 54 BPM | DIASTOLIC BLOOD PRESSURE: 70 MMHG | OXYGEN SATURATION: 96 % | DIASTOLIC BLOOD PRESSURE: 66 MMHG

## 2021-09-02 ENCOUNTER — EKG ENCOUNTER (OUTPATIENT)
Dept: LAB | Facility: HOSPITAL | Age: 81
End: 2021-09-02
Attending: INTERNAL MEDICINE
Payer: COMMERCIAL

## 2021-09-02 DIAGNOSIS — R00.1 BRADYCARDIA: Primary | ICD-10-CM

## 2021-09-02 PROCEDURE — 93010 ELECTROCARDIOGRAM REPORT: CPT | Performed by: INTERNAL MEDICINE

## 2021-09-02 PROCEDURE — 93005 ELECTROCARDIOGRAM TRACING: CPT

## 2021-09-03 LAB
ATRIAL RATE: 52 BPM
P AXIS: 68 DEGREES
P-R INTERVAL: 168 MS
Q-T INTERVAL: 414 MS
QRS DURATION: 78 MS
QTC CALCULATION (BEZET): 385 MS
R AXIS: 75 DEGREES
T AXIS: 78 DEGREES
VENTRICULAR RATE: 52 BPM

## 2021-09-24 ENCOUNTER — HOSPITAL ENCOUNTER (OUTPATIENT)
Dept: MRI IMAGING | Facility: HOSPITAL | Age: 81
Discharge: HOME | End: 2021-09-24
Attending: INTERNAL MEDICINE
Payer: COMMERCIAL

## 2021-09-24 DIAGNOSIS — R42 DIZZINESS: ICD-10-CM

## 2021-09-24 DIAGNOSIS — I63.9 CVA (CEREBRAL VASCULAR ACCIDENT) (HCC): ICD-10-CM

## 2021-09-24 PROCEDURE — 70551 MRI BRAIN STEM W/O DYE: CPT | Performed by: INTERNAL MEDICINE

## 2021-10-02 LAB
APPEARANCE UR: CLEAR
BACTERIA #/AREA URNS HPF: NORMAL /HPF
BACTERIA UR CULT: NORMAL
BILIRUB UR QL STRIP: NEGATIVE
COLOR UR: YELLOW
GLUCOSE UR QL STRIP: NEGATIVE
HGB UR QL STRIP: NEGATIVE
HYALINE CASTS #/AREA URNS LPF: NORMAL /LPF
KETONES UR QL STRIP: NEGATIVE
LEUKOCYTE ESTERASE UR QL STRIP: NEGATIVE
NITRITE UR QL STRIP: NEGATIVE
PH UR STRIP: 5.5 [PH] (ref 5–8)
PROT UR QL STRIP: NEGATIVE
RBC #/AREA URNS HPF: NORMAL /HPF
SP GR UR STRIP: 1.02 (ref 1–1.03)
SQUAMOUS #/AREA URNS HPF: NORMAL /HPF
TSH SERPL-ACNC: 1.61 MIU/L (ref 0.4–4.5)
WBC #/AREA URNS HPF: NORMAL /HPF

## 2021-11-03 RX ORDER — MULTIVIT-MIN/IRON/FOLIC ACID/K 18-600-40
2000 CAPSULE ORAL WEEKLY
COMMUNITY

## 2021-11-03 RX ORDER — QUETIAPINE 25 MG/1
25 TABLET, FILM COATED ORAL 2 TIMES DAILY
COMMUNITY

## 2021-11-06 ENCOUNTER — LAB ENCOUNTER (OUTPATIENT)
Dept: LAB | Facility: HOSPITAL | Age: 81
End: 2021-11-06
Attending: INTERNAL MEDICINE
Payer: COMMERCIAL

## 2021-11-06 DIAGNOSIS — I42.9 CARDIOMYOPATHY (HCC): ICD-10-CM

## 2021-11-06 PROCEDURE — 80048 BASIC METABOLIC PNL TOTAL CA: CPT

## 2021-11-06 PROCEDURE — 85025 COMPLETE CBC W/AUTO DIFF WBC: CPT

## 2021-11-06 PROCEDURE — 36415 COLL VENOUS BLD VENIPUNCTURE: CPT

## 2021-11-09 ENCOUNTER — HOSPITAL ENCOUNTER (OUTPATIENT)
Dept: INTERVENTIONAL RADIOLOGY/VASCULAR | Facility: HOSPITAL | Age: 81
Discharge: HOME | End: 2021-11-09
Attending: INTERNAL MEDICINE | Admitting: INTERNAL MEDICINE
Payer: COMMERCIAL

## 2021-11-09 VITALS
BODY MASS INDEX: 22.86 KG/M2 | OXYGEN SATURATION: 98 % | RESPIRATION RATE: 19 BRPM | SYSTOLIC BLOOD PRESSURE: 128 MMHG | HEIGHT: 63 IN | WEIGHT: 129 LBS | HEART RATE: 64 BPM | DIASTOLIC BLOOD PRESSURE: 66 MMHG

## 2021-11-09 DIAGNOSIS — I42.9 CARDIOMYOPATHY (HCC): Primary | ICD-10-CM

## 2021-11-09 DIAGNOSIS — I42.9 CARDIOMYOPATHY, UNSPECIFIED TYPE (HCC): ICD-10-CM

## 2021-11-09 DIAGNOSIS — R53.83 FATIGUE: ICD-10-CM

## 2021-11-09 PROCEDURE — B2111ZZ FLUOROSCOPY OF MULTIPLE CORONARY ARTERIES USING LOW OSMOLAR CONTRAST: ICD-10-PCS | Performed by: INTERNAL MEDICINE

## 2021-11-09 PROCEDURE — B2151ZZ FLUOROSCOPY OF LEFT HEART USING LOW OSMOLAR CONTRAST: ICD-10-PCS | Performed by: INTERNAL MEDICINE

## 2021-11-09 PROCEDURE — 99152 MOD SED SAME PHYS/QHP 5/>YRS: CPT

## 2021-11-09 PROCEDURE — 93460 R&L HRT ART/VENTRICLE ANGIO: CPT

## 2021-11-09 PROCEDURE — 99153 MOD SED SAME PHYS/QHP EA: CPT

## 2021-11-09 PROCEDURE — 4A023N8 MEASUREMENT OF CARDIAC SAMPLING AND PRESSURE, BILATERAL, PERCUTANEOUS APPROACH: ICD-10-PCS | Performed by: INTERNAL MEDICINE

## 2021-11-09 RX ORDER — HEPARIN SODIUM 5000 [USP'U]/ML
INJECTION, SOLUTION INTRAVENOUS; SUBCUTANEOUS
Status: COMPLETED
Start: 2021-11-09 | End: 2021-11-09

## 2021-11-09 RX ORDER — ACETAMINOPHEN 325 MG/1
TABLET ORAL
Status: COMPLETED
Start: 2021-11-09 | End: 2021-11-09

## 2021-11-09 RX ORDER — LIDOCAINE HYDROCHLORIDE 10 MG/ML
INJECTION, SOLUTION EPIDURAL; INFILTRATION; INTRACAUDAL; PERINEURAL
Status: COMPLETED
Start: 2021-11-09 | End: 2021-11-09

## 2021-11-09 RX ORDER — MIDAZOLAM HYDROCHLORIDE 1 MG/ML
INJECTION INTRAMUSCULAR; INTRAVENOUS
Status: COMPLETED
Start: 2021-11-09 | End: 2021-11-09

## 2021-11-09 RX ORDER — SODIUM CHLORIDE 9 MG/ML
INJECTION, SOLUTION INTRAVENOUS
Status: DISCONTINUED | OUTPATIENT
Start: 2021-11-10 | End: 2021-11-09 | Stop reason: HOSPADM

## 2021-11-09 NOTE — PLAN OF CARE
Patient had [facility] with Dr. Migel Johnston today. Right groin soft, CDI, no hematoma, +1 pedal pulse. VSS. Wife is @ bedside. Patient and wife are Monegasque speaking only. Dr. Migel Johnston @ bedside post procedure and spoke with son over the phone.  Patient tolerating

## 2021-11-10 NOTE — PROCEDURES
[facility]    PATIENT'S NAME: Brittanie Andrews   ATTENDING PHYSICIAN: Shona Vila M.D.    OPERATING PHYSICIAN: Dylan Ferreira MD   PATIENT ACCOUNT#:   [de-identified]    LOCATION:  [address] [place] [address] Dr NIEL[facility] ED  MEDICAL RECORD #:   [id number]       LUCA left circumflex artery has no significant stenosis or disease. The right coronary artery has 20% stenosis proximally of mild atherosclerotic disease in its midportion and 20% stenosis in distal portion. The LAD has 40% stenosis in midportion.   The re

## 2021-11-19 ENCOUNTER — HOSPITAL ENCOUNTER (OUTPATIENT)
Dept: LAB | Facility: HOSPITAL | Age: 81
Discharge: HOME | End: 2021-11-19
Attending: INTERNAL MEDICINE
Payer: COMMERCIAL

## 2021-11-19 PROCEDURE — 36415 COLL VENOUS BLD VENIPUNCTURE: CPT | Performed by: INTERNAL MEDICINE

## 2021-11-19 PROCEDURE — 80061 LIPID PANEL: CPT | Performed by: INTERNAL MEDICINE

## 2022-09-02 ENCOUNTER — APPOINTMENT (OUTPATIENT)
Dept: GENERAL RADIOLOGY | Facility: HOSPITAL | Age: 82
End: 2022-09-02
Attending: EMERGENCY MEDICINE
Payer: COMMERCIAL

## 2022-09-02 ENCOUNTER — APPOINTMENT (OUTPATIENT)
Dept: CT IMAGING | Facility: HOSPITAL | Age: 82
End: 2022-09-02
Attending: EMERGENCY MEDICINE
Payer: COMMERCIAL

## 2022-09-02 PROBLEM — F02.80 MAJOR NEUROCOGNITIVE DISORDER DUE TO ALZHEIMER'S DISEASE (HCC): Status: ACTIVE | Noted: 2022-09-02

## 2022-09-02 PROBLEM — G30.9 MAJOR NEUROCOGNITIVE DISORDER DUE TO ALZHEIMER'S DISEASE (HCC): Status: ACTIVE | Noted: 2022-09-02

## 2022-09-02 PROCEDURE — 71045 X-RAY EXAM CHEST 1 VIEW: CPT | Performed by: EMERGENCY MEDICINE

## 2022-09-02 PROCEDURE — 70450 CT HEAD/BRAIN W/O DYE: CPT | Performed by: EMERGENCY MEDICINE

## 2022-09-02 PROCEDURE — 74177 CT ABD & PELVIS W/CONTRAST: CPT | Performed by: EMERGENCY MEDICINE

## 2022-09-02 NOTE — ED NOTES
Pt's POA, spouse signed in. Pt's volunteer rights were explained to this pt and spouse by [facility] [place] . Copies of P&C, and rights ([place] version) are scanned into the charts.

## 2022-09-02 NOTE — CM/SW NOTE
Spoke to wife at the bedside who is Faroese speaking about her  who has been having worsening dementia and aggressive behavior. She states that patient is aggressive towards her at times and with family. He has been worsening in the last two weeks which is why she brought the patient in for an eval. [facility] has assessed the patient and they will recommend inpatient placement for the patient at this time. Wife agrees with this at this time. She has been looking for potential long term placement for the patient and is working on finalizing his medicaid since she says that he qualifies. I did inform her that through medicaid he might be able to go to a long term bed at a nursing facility if she decides to do that in the future. She understands this but would like to see what else can be done at the psychiatric facility.

## 2022-09-02 NOTE — PROGRESS NOTES
09/02/22 1353   COVID Exposure Risk Screening   Do you have any of the following new or worsening symptoms of COVID-19? None   Have you been diagnosed with COVID-19 within the past 10 days? No   Are you awaiting COVID-19 test results or do you have a COVID-19 test scheduled? No   In the past 10 days, have you been in contact with someone who was confirmed or suspected to have COVID-19?  No

## 2022-09-02 NOTE — ED INITIAL ASSESSMENT (HPI)
Patient brought to ED by wife. Per wife \"patient has being more confuse, and agitated last since yesterday, but today he is having hallucinations\". Patient has Alzheimer.

## 2022-09-02 NOTE — BH LEVEL OF CARE ASSESSMENT
Crisis Evaluation Assessment    Black Escobedo YOB: 1940   Age 81year old MRN [id number]   Location [facility] Attending Suly Weber MD          Patient's legal sex: male  Patient identifies as: male  Patient's birth sex: male  Preferred pronouns: Him/His    Date of Service: 2022    Referral Source:  Referral Source  Referral Source: Friend/Relative  Referral Source Info: Wife brought the pt. Reason for Crisis Evaluation   Pt was unable to participate in the assessment due to history of Alzehimer's and could not remember why he was brought in. Pt was calm and cooperative but could not focus on assessment questions. Collateral    Pt's wife, Cristina Marshall was at bedside and helped to provide collateral.  Per Cristina Marshall pt reside at home with her. She is pt's primary caregiver at home. Pt has a history of Alzheimer's and aggresion. Cristina Marshall helps this pt with his ADLs. She transports the pt to  five times a week (Monday to Friday) except if pt is not feeling well at any day. Cristina Marshall takes pt for his doctor's visits regularly, pt usually sees his PCP and neurologist. Per Cristina Marshall, pt is exhiibiting  aggression more often now. Pt yells and screams. Pt sleeps from 21:00 to 5:00am. His appetite fluctuates but lately it got worse. Cristina Marshall sets up food out for this pt and he feeds him self. Cristina Marshall reports, pt hallucinates and talks with his  mother and often gets physically aggressive. Pt started to hallucinate two years ago, pt's neurologist started managing the medication to trat pt's hullucinations. Pt did not see any psychologist or psychiatrist. Per Cristina Marshall pt expressed to die but with no plan or intend. Pt is not SI/HI and did not try to damage property. No legal issues reported. No drugs use. PT is compliant with his medications and doctor's follow ups.    Recently, pt's PCP recommended to bring this pt to ER due to increase in hallucinations and aggression for the evaluation for inpatientt psych admission. Risk to Self or Others  Per Jaimee Organ (spouse), pt expressed to die but with no plan or intent. Pt is not SI/HI or damaging property. Pt is currently hallucinating and exhibiting aggression towards wife. Suicide Risk Assessments:    Source of information for CSSR: Collateral;Patient  In what setting is the screener performed?: in person  1. Have you wished you were dead or wished you could go to sleep and not wake up? (past 30 days): Yes  2. Have you actually had any thoughts of killing yourself? (past 30 days): Yes  3. Have you been thinking about how you might kill yourself? (past 30 days): No  4. Have you had these thoughts and had some intention of acting on them? (past 30 days): No  5a. Have you started to work out or worked out the details of how to kill yourself? (past 30 days): No  5b. Do you intend to carry out this plan? (past 30 days): No  6. Have you ever done anything, started to do anything, or prepared to do anything to end your life? (lifetime): No     Score -  OV: 2- Low Risk   Describe : PT has thought no plan. Is your experience of thoughts of dying by suicide: Other     Past Suicidal Ideation: Denies        Family History or Personal Lived Experience of Loss or Near Loss by Suicide: Denies          Non-Suicidal Self-Injury:   No self injury to report at this time. Per Jaimee Organ (spouse), pt recently expressed to die but with no plan or intent. Pt is not SI/HI or damaging property. Pt is currently hallucinating and exhibiting aggression towards wife. Access to Means:  Access to Means  Has access to means to attempt suicide or harm others or property: No  Discussion of Removal of Access to Means: Pt does not have access to weapons or other harmful objects at home. Access to Firearm/Weapon: No  Discussion of Removal of Firearm/Weapon: Pt does not have access to weapons or other harmful objects at home.   Do you have a firearm owner ID card?: No  Collateral for any access to means/firearms/weapons: Pt does not have access to weapons or other harmful objects at home. None    Protective Factors:   Pt has supportive wife who is the primary care giver. Pt enjoys going to  five time a week. Nikkireji Head takes him regularly for doctor's visits. Review of Psychiatric Systems:    Pt has a history of Alzheimer's and hallucinations with aggression. Pt is seeing his neurologist for past two years for hallucinations. Pt is actively hallucination and speaking with his  dmother and exhibiting aggression. Per [facility] pt has on and off anxiety. Per larissa pt sleeps with form 2100 to 5:00 with poor appetite. No delusions or depression. Substance Use:  Kade [facility] pt has no history of using any substance. Pt never consumed alcohol nor smoked a cigarette. Functional Achievement:     Per [facility], pt and she immigrated to [place] six years ago. Pt maintained to have a full time job for initiate few years and they resigned. Pt used to help [facility] with house chores but due to the diagnosis of Alzheimer's and hallucination it got effected. Pt decreased his participation with ADLs. Current Treatment and Treatment History:  Per [facility], pt is actively seeing his neurologist for the treatment of alzheimer's, hallucination and aggression. Pt saw his PCP yesterday who recommended Larissa to bring this pt to ER for inpatient psych evaluation. Per [facility] pt is compliant with his medications and treatment. Pt has no past psychiatric inpatient and outpatient history. Relevant Social History:  Per [facility] pt's  mother had a history of dementia. Mother did not have SI/HI. Per [facility], pt is happily  with a great family support system. [facility] is also pt's primary caregiver. Per [facility], their children are also involved in pt's care and visit them often at home.        Gen[place] and Complex (as applicable):  Geriatric Functioning  Dementia Symptoms Observed/Expressed: Yes  Aberrant Motor Activity: None  Potentially Dangerous Behaviors: Agitation  Noisy Vocalizations: Yelling;Screaming  Frequent Distress Calls: Yes (Describe)  Describe Frequent Distress Calls[de-identified] PT goes to day care and having agitation every day. Responsiveness to Reassurance: Yes  Current Living Situation  Current Living Situation: Private Residence  Sight and Sound  Is the patient verbal?: Yes  Vision or hearing correction?: Eye Glasses  Patient able to understand and follow directions?: No  Patient able to express needs?: Yes  Feeding and Swallowing  History of aspiration or choking?: No  Feeding assistance needed?: No  Patient have any chewing or swallowing problems?: No  Special Diet: No  Mobility/Activity & Assistive Devices  Current/recent injuries or surgeries that affect mobility?: No  Physical Limitations Present: None  Independent in ambulation?: Yes  Transfer Assist: No Assistance Needed  Assistive Device Used[de-identified] None  History of falls?: No  Grooming  Level of independence in dressing: Requires set-up/cues  Level of independence to shower/bathe/wash: Requires set-up/cues  Level of independence in personal grooming tasks (e.g., brushing teeth, brushing hair, applying hearing aids, shaving, etc.): Requires set-up/cues  Toileting  Patient Incontinence: Bladder  Special Considerations  Patient has pressures sores, surgical wounds, bandages/dressings?: No  Patient uses any kind of pump?: No  Does the patient need a BiPAP or CPAP?: No  Intellectual disability reported?   Intellectual Disability?: No         Abuse Assessment:  Abuse Assessment  Physical Abuse: Unable to assess  Verbal Abuse: Unable to assess  Sexual Abuse: Unable to assess  Neglect: Unable to assess  Does anyone say or do something to you that makes you feel unsafe?: Unable to Assess (Comment)  Possible Abuse Reportable to[de-identified] Not appropriate for reporting to authorities Mental Status Exam:   General Appearance  Characteristics: Appropriate clothing  Eye Contact: Direct  Psychomotor Behavior  Gait/Movement: Normal  Abnormal movements: None  Posture: Relaxed  Rate of Movement: Normal  Mood and Affect  Mood or Feelings: Calm  Appropriateness of Affect: Appropriate to situation  Range of Affect: Normal  Stability of Affect: Stable  Attitude toward staff: Friendly  Speech  Rate of Speech: Appropriate  Flow of Speech: Appropriate  Intensity of Volume: Ordinary  Clarity: Clear  Cognition  Concentration: Impaired  Memory: Unable to assess  Orientation Level: Unable to assess  Thought Patterns  Clarity/Relevance: Irrelevant to topic  Flow: Disorganized  Content: Ordinary  Level of Consciousness: Drowsy  Level of Consciousness: Drowsy  Behavior  Exhibited behavior: Appropriate to situation      Disposition:    Consulted with Dr. Shawna Winters. At this time, level of care recommendation is inpatient acute care for this pt. Assessment Summary:     SW met with pt and his wife, Belkis Glynn at bedside. Pt was clam and cooperative in bed. Pt was unable to participate in the assessment due to a history of Alzheimer's. Pt has impaired cognition with impairment in daily functioning. Belkis Glynn participated in the assessment and provided all the required information. Larissa's primary language was Slovak, case management helped with the translation. Per Belkis Glynn, pt resides at home wit her and she is his primary care giver. Belkis Glynn reports to help this pt with his ADLs. She transports the pt to  five times a week (Monday to Friday) and to his doctor's visits regularly. Pt regularly sees his PCP and neurologist. Per Belkis Glynn, recently, pt has increased exhibition of yelling and screaming with hallucinations at home. Pt hallucinated and talked with his  mother and often got physically aggressive.  Per Belkis Glynn, pt started to hallucinate two years ago and since then, pt's neurologist has been managing the medication to treat pt's hullucinations. Kade Awad, pt sleeps from 21:00 to 5:00am, everyday. His appetite fluctuates but lately it got worse. John Awad helps the pt with all of his ADLs inculding seting up food out and then pt feeds him self, independently. No food restrictions. Pt does not see any psychologist or psychiatrist but John Awad expressed to have one for this pt after completion of his current treatment. Kade Awad pt expressed to die but he has no plan or intend. Pt is not SI/HI and did not try to damage property. No legal issues reported. No drugs use. PT is compliant with his medications and doctor's follow ups. Pt's CSSRS score is 2. Kade Awad pt saw his PCP yesterday and PCP recommended to bring this pt to ER due to increase in hallucinations with  aggression for the evaluation for inpatientt psych admission. Level of Care Recommendations  Consulted with: Dr. Cristofer Rinaldi  Level of Care Recommendation: Inpatient Acute Care  Unit: [facility]  Reason for Unit Assigned: Age/symptoms.   Inpatient Criteria: Inability to care for self;Severely decreased function  Behavioral Precautions: Elopement  Medical Precautions: None  Refused Treatment: No  Sign-In  Inpatient Admission Type: Petition/Certificates Only  Patient Provided: Patient Unable to Receive (specify reason - comment)  Patient Verbalized Understanding: Unable to respond (specify reason - comment)        Diagnoses:  Primary Psychiatric Diagnosis  Dementia with behavioral disturbances and aggression - F03.91

## 2022-09-04 PROBLEM — G30.9 MAJOR NEUROCOGNITIVE DISORDER DUE TO ALZHEIMER'S DISEASE (HCC): Status: RESOLVED | Noted: 2022-09-02 | Resolved: 2022-09-04

## 2022-09-04 PROBLEM — G30.9 ALZHEIMER'S DEMENTIA WITH BEHAVIORAL DISTURBANCE (HCC): Chronic | Status: ACTIVE | Noted: 2022-09-02

## 2022-09-04 PROBLEM — F02.80 MAJOR NEUROCOGNITIVE DISORDER DUE TO ALZHEIMER'S DISEASE (HCC): Status: RESOLVED | Noted: 2022-09-02 | Resolved: 2022-09-04

## 2022-09-04 PROBLEM — F02.81 ALZHEIMER'S DEMENTIA WITH BEHAVIORAL DISTURBANCE (HCC): Status: ACTIVE | Noted: 2022-09-02

## 2022-09-04 PROBLEM — F29 PSYCHOTIC DISORDER (HCC): Chronic | Status: ACTIVE | Noted: 2022-09-04

## 2022-09-04 PROBLEM — F02.81 ALZHEIMER'S DEMENTIA WITH BEHAVIORAL DISTURBANCE (HCC): Chronic | Status: ACTIVE | Noted: 2022-09-02

## 2022-09-04 PROBLEM — F51.05 INSOMNIA RELATED TO ANOTHER MENTAL DISORDER: Chronic | Status: ACTIVE | Noted: 2022-09-04

## 2022-09-04 PROBLEM — G30.9 ALZHEIMER'S DEMENTIA WITH BEHAVIORAL DISTURBANCE (HCC): Status: ACTIVE | Noted: 2022-09-02

## 2022-09-04 PROBLEM — F02.818 ALZHEIMER'S DEMENTIA WITH BEHAVIORAL DISTURBANCE (HCC): Chronic | Status: ACTIVE | Noted: 2022-09-02

## 2022-09-04 PROBLEM — F02.818 ALZHEIMER'S DEMENTIA WITH BEHAVIORAL DISTURBANCE (HCC): Status: ACTIVE | Noted: 2022-09-02

## 2022-10-01 ENCOUNTER — HOSPITAL ENCOUNTER (EMERGENCY)
Facility: HOSPITAL | Age: 82
Discharge: HOME | End: 2022-10-01
Attending: EMERGENCY MEDICINE
Payer: COMMERCIAL

## 2022-10-01 ENCOUNTER — HOSPITAL ENCOUNTER (EMERGENCY)
Facility: HOSPITAL | Age: 82
Discharge: NURSING, REHAB OR LONG-TERM CARE | End: 2022-10-01
Attending: EMERGENCY MEDICINE
Payer: COMMERCIAL

## 2022-10-01 ENCOUNTER — APPOINTMENT (OUTPATIENT)
Dept: GENERAL RADIOLOGY | Facility: HOSPITAL | Age: 82
End: 2022-10-01
Attending: EMERGENCY MEDICINE
Payer: COMMERCIAL

## 2022-10-01 VITALS
WEIGHT: 110.25 LBS | DIASTOLIC BLOOD PRESSURE: 67 MMHG | TEMPERATURE: 98 F | HEART RATE: 60 BPM | SYSTOLIC BLOOD PRESSURE: 109 MMHG | RESPIRATION RATE: 18 BRPM | BODY MASS INDEX: 17 KG/M2 | OXYGEN SATURATION: 99 %

## 2022-10-01 DIAGNOSIS — S61.411A LACERATION OF RIGHT HAND WITHOUT FOREIGN BODY, INITIAL ENCOUNTER: Primary | ICD-10-CM

## 2022-10-01 PROCEDURE — 73130 X-RAY EXAM OF HAND: CPT | Performed by: EMERGENCY MEDICINE

## 2022-10-01 PROCEDURE — 99283 EMERGENCY DEPT VISIT LOW MDM: CPT

## 2022-10-01 PROCEDURE — 12002 RPR S/N/AX/GEN/TRNK2.6-7.5CM: CPT

## 2022-10-01 PROCEDURE — 99284 EMERGENCY DEPT VISIT MOD MDM: CPT

## 2022-10-01 NOTE — ED INITIAL ASSESSMENT (HPI)
Pt to ED from [facility] via [facility] with c/o unwitnessed fall. Pt has a Hx of dementia. Laceration noted to fold of right hand between thumb and index finger. Pt denies pain or other injuries.  C-collar cleared by MD.

## 2022-10-01 NOTE — ED QUICK NOTES
Pt alert and oriented to person, place, and situation. Pt does not remember fall. Pt undressed and changed into gown.

## 2022-10-01 NOTE — ED QUICK NOTES
Report given to [facility] at [address] [place] [address], discharge instructions given. Normal rate, regular rhythm, normal S1, S2 heart sounds heard.

## 2022-11-27 ENCOUNTER — HOSPITAL ENCOUNTER (EMERGENCY)
Facility: HOSPITAL | Age: 82
Discharge: NURSING, REHAB OR LONG-TERM CARE | End: 2022-11-27
Attending: EMERGENCY MEDICINE
Payer: COMMERCIAL

## 2022-11-27 VITALS
SYSTOLIC BLOOD PRESSURE: 115 MMHG | TEMPERATURE: 98 F | OXYGEN SATURATION: 97 % | DIASTOLIC BLOOD PRESSURE: 72 MMHG | RESPIRATION RATE: 16 BRPM | HEART RATE: 50 BPM

## 2022-11-27 DIAGNOSIS — F03.918 AGGRESSIVE BEHAVIOR DUE TO DEMENTIA: Primary | ICD-10-CM

## 2022-11-27 LAB
AMPHET UR QL SCN: NEGATIVE
ANION GAP SERPL CALC-SCNC: 2 MMOL/L (ref 0–18)
APAP SERPL-MCNC: <2 UG/ML (ref 10–30)
BARBITURATES UR QL SCN: NEGATIVE
BUN BLD-MCNC: 19 MG/DL (ref 7–18)
BUN/CREAT SERPL: 20.4 (ref 10–20)
CALCIUM BLD-MCNC: 8.3 MG/DL (ref 8.5–10.1)
CANNABINOIDS UR QL SCN: NEGATIVE
CHLORIDE SERPL-SCNC: 108 MMOL/L (ref 98–112)
CO2 SERPL-SCNC: 32 MMOL/L (ref 21–32)
COCAINE UR QL: NEGATIVE
CREAT BLD-MCNC: 0.93 MG/DL
CREAT UR-SCNC: 174 MG/DL
DEPRECATED RDW RBC AUTO: 50.1 FL (ref 35.1–46.3)
ERYTHROCYTE [DISTWIDTH] IN BLOOD BY AUTOMATED COUNT: 13.4 % (ref 11–15)
ETHANOL SERPL-MCNC: <3 MG/DL (ref ?–3)
GFR SERPLBLD BASED ON 1.73 SQ M-ARVRAT: 82 ML/MIN/1.73M2 (ref 60–?)
GLUCOSE BLD-MCNC: 85 MG/DL (ref 70–99)
HCT VFR BLD AUTO: 35.8 %
HGB BLD-MCNC: 11.6 G/DL
MCH RBC QN AUTO: 33 PG (ref 26–34)
MCHC RBC AUTO-ENTMCNC: 32.4 G/DL (ref 31–37)
MCV RBC AUTO: 101.7 FL
MDMA UR QL SCN: NEGATIVE
METHADONE UR QL SCN: NEGATIVE
OPIATES UR QL SCN: NEGATIVE
OSMOLALITY SERPL CALC.SUM OF ELEC: 296 MOSM/KG (ref 275–295)
OXYCODONE UR QL SCN: NEGATIVE
PCP UR QL SCN: NEGATIVE
PLATELET # BLD AUTO: 143 10(3)UL (ref 150–450)
POTASSIUM SERPL-SCNC: 3.7 MMOL/L (ref 3.5–5.1)
RBC # BLD AUTO: 3.52 X10(6)UL
SALICYLATES SERPL-MCNC: <1.7 MG/DL (ref 2.8–20)
SARS-COV-2 RNA RESP QL NAA+PROBE: NOT DETECTED
SODIUM SERPL-SCNC: 142 MMOL/L (ref 136–145)
WBC # BLD AUTO: 6.7 X10(3) UL (ref 4–11)

## 2022-11-27 PROCEDURE — 93010 ELECTROCARDIOGRAM REPORT: CPT | Performed by: EMERGENCY MEDICINE

## 2022-11-27 PROCEDURE — 82077 ASSAY SPEC XCP UR&BREATH IA: CPT | Performed by: EMERGENCY MEDICINE

## 2022-11-27 PROCEDURE — 99285 EMERGENCY DEPT VISIT HI MDM: CPT

## 2022-11-27 PROCEDURE — 80307 DRUG TEST PRSMV CHEM ANLYZR: CPT | Performed by: EMERGENCY MEDICINE

## 2022-11-27 PROCEDURE — 80179 DRUG ASSAY SALICYLATE: CPT | Performed by: EMERGENCY MEDICINE

## 2022-11-27 PROCEDURE — 93005 ELECTROCARDIOGRAM TRACING: CPT

## 2022-11-27 PROCEDURE — 85027 COMPLETE CBC AUTOMATED: CPT | Performed by: EMERGENCY MEDICINE

## 2022-11-27 PROCEDURE — 80048 BASIC METABOLIC PNL TOTAL CA: CPT | Performed by: EMERGENCY MEDICINE

## 2022-11-27 PROCEDURE — 80143 DRUG ASSAY ACETAMINOPHEN: CPT | Performed by: EMERGENCY MEDICINE

## 2022-11-27 PROCEDURE — 36415 COLL VENOUS BLD VENIPUNCTURE: CPT

## 2022-11-27 RX ORDER — TRAZODONE HYDROCHLORIDE 50 MG/1
50 TABLET ORAL NIGHTLY
Status: ON HOLD | COMMUNITY

## 2022-11-27 RX ORDER — ALPRAZOLAM 0.5 MG/1
0.5 TABLET ORAL EVERY 8 HOURS PRN
Status: ON HOLD | COMMUNITY

## 2022-11-27 NOTE — PROGRESS NOTES
11/27/22 1624   COVID Exposure Risk Screening   Do you have any of the following new or worsening symptoms of COVID-19? None  (per wife)   Have you been diagnosed with COVID-19 within the past 10 days? No  (per wife. she reports that she and her  had Josep Dietz in June of 2022)   Are you awaiting COVID-19 test results or do you have a COVID-19 test scheduled? No   In the past 10 days, have you been in contact with someone who was confirmed or suspected to have COVID-19?  No
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
## 2022-11-27 NOTE — ED QUICK NOTES
Petition filled out by prior to arrival by [facility] [place] staff noted to be filled out incorrectly, this RN to fill out new petition per Dr. Alejandra Khan.
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
## 2022-11-28 PROBLEM — F03.918 DEMENTIA WITH BEHAVIORAL DISTURBANCE: Status: RESOLVED | Noted: 2022-11-27 | Resolved: 2022-11-28

## 2022-11-28 LAB
ATRIAL RATE: 54 BPM
P AXIS: 84 DEGREES
P-R INTERVAL: 166 MS
Q-T INTERVAL: 446 MS
QRS DURATION: 78 MS
QTC CALCULATION (BEZET): 422 MS
R AXIS: 73 DEGREES
T AXIS: 77 DEGREES
VENTRICULAR RATE: 54 BPM

## 2022-11-28 NOTE — ED QUICK NOTES
[facility] here for transport. All paperwork provided to medics. Patient's belongings with medics. Patient off floor in stable condition.

## 2022-11-28 NOTE — ED QUICK NOTES
EKG and [facility] documentation requested by [address] [place] [address] completed based on NH information and report from previous RN, Mariano Gleason. Patient remains calm on cart in room.

## 2022-11-28 NOTE — ED QUICK NOTES
Patient accepted to [facility] by Dr. Clarisa Stevens. Report given to RICHIE Beverly ([id number]). [facility] ETA is 90 minutes for transport.

## 2022-11-28 NOTE — PROGRESS NOTES
11/27/22 1919   Violence Aggression Assessment Checklist   Behaviors Exhibited By: Patient  (these behaviors were not noted by this RN, but were reported from [facility].)   [facility] - Patient   History of Violence 1   Uncooperative 1   Verbal Abuse 0   Hostile/Attacking Objects 0   Threats 0   Assaultive/Combative 2   VAAC Risk Score 4   VAAC Level of Risk High Risk

## 2022-12-02 ENCOUNTER — APPOINTMENT (OUTPATIENT)
Dept: GENERAL RADIOLOGY | Facility: HOSPITAL | Age: 82
End: 2022-12-02
Attending: EMERGENCY MEDICINE
Payer: COMMERCIAL

## 2022-12-02 ENCOUNTER — HOSPITAL ENCOUNTER (EMERGENCY)
Facility: HOSPITAL | Age: 82
Discharge: HOME | End: 2022-12-02
Attending: EMERGENCY MEDICINE
Payer: COMMERCIAL

## 2022-12-02 ENCOUNTER — HOSPITAL ENCOUNTER (OUTPATIENT)
Facility: HOSPITAL | Age: 82
Setting detail: OBSERVATION
Discharge: NURSING, REHAB OR LONG-TERM CARE | End: 2022-12-10
Attending: EMERGENCY MEDICINE | Admitting: HOSPITALIST
Payer: COMMERCIAL

## 2022-12-02 ENCOUNTER — HOSPITAL ENCOUNTER (OUTPATIENT)
Facility: HOSPITAL | Age: 82
Setting detail: OBSERVATION
End: 2022-12-02
Attending: EMERGENCY MEDICINE | Admitting: HOSPITALIST
Payer: COMMERCIAL

## 2022-12-02 VITALS
TEMPERATURE: 100 F | HEART RATE: 93 BPM | SYSTOLIC BLOOD PRESSURE: 99 MMHG | DIASTOLIC BLOOD PRESSURE: 59 MMHG | RESPIRATION RATE: 13 BRPM | OXYGEN SATURATION: 97 %

## 2022-12-02 DIAGNOSIS — F29 PSYCHOSIS, UNSPECIFIED PSYCHOSIS TYPE (HCC): Chronic | ICD-10-CM

## 2022-12-02 DIAGNOSIS — J06.9 VIRAL URI: Primary | ICD-10-CM

## 2022-12-02 DIAGNOSIS — I95.9 HYPOTENSION, UNSPECIFIED HYPOTENSION TYPE: Primary | ICD-10-CM

## 2022-12-02 DIAGNOSIS — F02.818 ALZHEIMER'S DEMENTIA WITH BEHAVIORAL DISTURBANCE (HCC): Chronic | ICD-10-CM

## 2022-12-02 DIAGNOSIS — G30.9 ALZHEIMER'S DEMENTIA WITH BEHAVIORAL DISTURBANCE (HCC): Chronic | ICD-10-CM

## 2022-12-02 LAB
ALBUMIN SERPL-MCNC: 3 G/DL (ref 3.4–5)
ALBUMIN/GLOB SERPL: 0.9 {RATIO} (ref 1–2)
ALP LIVER SERPL-CCNC: 68 U/L
ALT SERPL-CCNC: 46 U/L
ANION GAP SERPL CALC-SCNC: 3 MMOL/L (ref 0–18)
AST SERPL-CCNC: 27 U/L (ref 15–37)
BASOPHILS # BLD AUTO: 0.01 X10(3) UL (ref 0–0.2)
BASOPHILS NFR BLD AUTO: 0.1 %
BILIRUB SERPL-MCNC: 0.6 MG/DL (ref 0.1–2)
BILIRUB UR QL STRIP.AUTO: NEGATIVE
BUN BLD-MCNC: 26 MG/DL (ref 7–18)
CALCIUM BLD-MCNC: 9 MG/DL (ref 8.5–10.1)
CHLORIDE SERPL-SCNC: 103 MMOL/L (ref 98–112)
CLARITY UR REFRACT.AUTO: CLEAR
CO2 SERPL-SCNC: 31 MMOL/L (ref 21–32)
COLOR UR AUTO: YELLOW
CREAT BLD-MCNC: 1.08 MG/DL
EOSINOPHIL # BLD AUTO: 0 X10(3) UL (ref 0–0.7)
EOSINOPHIL NFR BLD AUTO: 0 %
ERYTHROCYTE [DISTWIDTH] IN BLOOD BY AUTOMATED COUNT: 13.4 %
FLUAV + FLUBV RNA SPEC NAA+PROBE: NEGATIVE
FLUAV + FLUBV RNA SPEC NAA+PROBE: NEGATIVE
GFR SERPLBLD BASED ON 1.73 SQ M-ARVRAT: 69 ML/MIN/1.73M2 (ref 60–?)
GLOBULIN PLAS-MCNC: 3.5 G/DL (ref 2.8–4.4)
GLUCOSE BLD-MCNC: 182 MG/DL (ref 70–99)
GLUCOSE UR STRIP.AUTO-MCNC: NEGATIVE MG/DL
HCT VFR BLD AUTO: 36 %
HGB BLD-MCNC: 11.6 G/DL
HYALINE CASTS #/AREA URNS AUTO: PRESENT /LPF
HYALINE CASTS #/AREA URNS AUTO: PRESENT /LPF
IMM GRANULOCYTES # BLD AUTO: 0.04 X10(3) UL (ref 0–1)
IMM GRANULOCYTES NFR BLD: 0.5 %
KETONES UR STRIP.AUTO-MCNC: NEGATIVE MG/DL
LACTATE SERPL-SCNC: 1.5 MMOL/L (ref 0.4–2)
LEUKOCYTE ESTERASE UR QL STRIP.AUTO: NEGATIVE
LYMPHOCYTES # BLD AUTO: 0.68 X10(3) UL (ref 1–4)
LYMPHOCYTES NFR BLD AUTO: 8.4 %
MCH RBC QN AUTO: 32.2 PG (ref 26–34)
MCHC RBC AUTO-ENTMCNC: 32.2 G/DL (ref 31–37)
MCV RBC AUTO: 100 FL
MONOCYTES # BLD AUTO: 0.46 X10(3) UL (ref 0.1–1)
MONOCYTES NFR BLD AUTO: 5.7 %
NEUTROPHILS # BLD AUTO: 6.92 X10 (3) UL (ref 1.5–7.7)
NEUTROPHILS # BLD AUTO: 6.92 X10(3) UL (ref 1.5–7.7)
NEUTROPHILS NFR BLD AUTO: 85.3 %
NITRITE UR QL STRIP.AUTO: NEGATIVE
OSMOLALITY SERPL CALC.SUM OF ELEC: 293 MOSM/KG (ref 275–295)
PH UR STRIP.AUTO: 7.5 [PH] (ref 5–8)
PLATELET # BLD AUTO: 149 10(3)UL (ref 150–450)
POTASSIUM SERPL-SCNC: 4.3 MMOL/L (ref 3.5–5.1)
PROT SERPL-MCNC: 6.5 G/DL (ref 6.4–8.2)
RBC # BLD AUTO: 3.6 X10(6)UL
RSV RNA SPEC NAA+PROBE: NEGATIVE
SARS-COV-2 RNA RESP QL NAA+PROBE: NOT DETECTED
SARS-COV-2 RNA RESP QL NAA+PROBE: NOT DETECTED
SODIUM SERPL-SCNC: 137 MMOL/L (ref 136–145)
SP GR UR STRIP.AUTO: 1.02 (ref 1–1.03)
TROPONIN I HIGH SENSITIVITY: 11 NG/L
UROBILINOGEN UR STRIP.AUTO-MCNC: 1 MG/DL
WBC # BLD AUTO: 8.1 X10(3) UL (ref 4–11)

## 2022-12-02 PROCEDURE — 99284 EMERGENCY DEPT VISIT MOD MDM: CPT

## 2022-12-02 PROCEDURE — 96361 HYDRATE IV INFUSION ADD-ON: CPT

## 2022-12-02 PROCEDURE — 81015 MICROSCOPIC EXAM OF URINE: CPT | Performed by: EMERGENCY MEDICINE

## 2022-12-02 PROCEDURE — 0241U SARS-COV-2/FLU A AND B/RSV BY PCR (GENEXPERT): CPT | Performed by: EMERGENCY MEDICINE

## 2022-12-02 PROCEDURE — 36415 COLL VENOUS BLD VENIPUNCTURE: CPT

## 2022-12-02 PROCEDURE — 87040 BLOOD CULTURE FOR BACTERIA: CPT | Performed by: EMERGENCY MEDICINE

## 2022-12-02 PROCEDURE — 99219 INITIAL OBSERVATION CARE,LEVL II: CPT | Performed by: HOSPITALIST

## 2022-12-02 PROCEDURE — 71045 X-RAY EXAM CHEST 1 VIEW: CPT | Performed by: EMERGENCY MEDICINE

## 2022-12-02 PROCEDURE — 81001 URINALYSIS AUTO W/SCOPE: CPT | Performed by: EMERGENCY MEDICINE

## 2022-12-02 RX ORDER — ACETAMINOPHEN 500 MG
1000 TABLET ORAL ONCE
Status: COMPLETED | OUTPATIENT
Start: 2022-12-02 | End: 2022-12-02

## 2022-12-02 NOTE — ED INITIAL ASSESSMENT (HPI)
PT TO THE ED FROM [facility]. WITH C/O LOW RBC,.  PT HAS A HX OF THROMBOCYTOPENIA. WAS TOLD PT ALSO HAD DIFFICULTY SWALLOWING HIS WATER THIS MORNING AND WAS SENT IN FOR A CHEST XRAY
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
## 2022-12-02 NOTE — CM/SW NOTE
For assistance with discharge planning, please call Merissa Marrero at [facility], [phone]. The nursing home will take patient back.

## 2022-12-03 PROBLEM — F29 PSYCHOSIS, UNSPECIFIED PSYCHOSIS TYPE (HCC): Chronic | Status: ACTIVE | Noted: 2022-12-03

## 2022-12-03 PROCEDURE — 90792 PSYCH DIAG EVAL W/MED SRVCS: CPT | Performed by: PHYSICIAN ASSISTANT

## 2022-12-03 PROCEDURE — 99225 SUBSEQUENT OBSERVATION CARE: CPT | Performed by: STUDENT IN AN ORGANIZED HEALTH CARE EDUCATION/TRAINING PROGRAM

## 2022-12-03 RX ORDER — ALPRAZOLAM 0.5 MG/1
0.5 TABLET ORAL NIGHTLY PRN
Status: DISCONTINUED | OUTPATIENT
Start: 2022-12-03 | End: 2022-12-05

## 2022-12-03 RX ORDER — HALOPERIDOL 2 MG/1
2 TABLET ORAL EVERY 4 HOURS PRN
Status: DISCONTINUED | OUTPATIENT
Start: 2022-12-03 | End: 2022-12-05

## 2022-12-03 RX ORDER — HEPARIN SODIUM 5000 [USP'U]/ML
5000 INJECTION, SOLUTION INTRAVENOUS; SUBCUTANEOUS EVERY 8 HOURS SCHEDULED
Status: DISCONTINUED | OUTPATIENT
Start: 2022-12-03 | End: 2022-12-10

## 2022-12-03 RX ORDER — HALOPERIDOL 5 MG/ML
2 INJECTION INTRAMUSCULAR EVERY 4 HOURS PRN
Status: DISCONTINUED | OUTPATIENT
Start: 2022-12-03 | End: 2022-12-05

## 2022-12-03 RX ORDER — SODIUM CHLORIDE 9 MG/ML
INJECTION, SOLUTION INTRAVENOUS CONTINUOUS
Status: DISCONTINUED | OUTPATIENT
Start: 2022-12-03 | End: 2022-12-08

## 2022-12-03 RX ORDER — MELATONIN
6 NIGHTLY
Status: DISCONTINUED | OUTPATIENT
Start: 2022-12-03 | End: 2022-12-10

## 2022-12-03 RX ORDER — TRAZODONE HYDROCHLORIDE 50 MG/1
50 TABLET ORAL NIGHTLY PRN
Status: DISCONTINUED | OUTPATIENT
Start: 2022-12-03 | End: 2022-12-10

## 2022-12-03 RX ORDER — QUETIAPINE FUMARATE 100 MG/1
100 TABLET, FILM COATED ORAL
Status: DISCONTINUED | OUTPATIENT
Start: 2022-12-03 | End: 2022-12-05

## 2022-12-03 NOTE — BH PROGRESS NOTE
Went to see the patient and his son and wife/POA was in the room with him. The son speaks [place] and patients wife a little [place]. Explained to them the reason for the visit<   patients rights and voluntary. Informed  the use of the interpretor service the hospital has. The son, Fior Bernardo stated the patients wife only trust him and they are requesting he will be the interpretor. The son said if he is not at the hospital he will always be available per phone. Therefore he is the one who explained the rights and voluntary. The wife/POA signed the forms and they were up loaded to this record. She received a copy of all the forms. The patients nurse Daina Schmidt is aware. Christiano Sánchez the PA was in the room when the wife signed the papers too.

## 2022-12-03 NOTE — CM/SW NOTE
Per Rn, plan is for [facility]. SW asked [facility] to send updates to [facility].     MICA CrowleyW  /Discharge Planner

## 2022-12-03 NOTE — CM/SW NOTE
Department  notified of request for [facility], aidin referrals started. Assigned CM/SW to follow up with pt/family on further discharge planning.      Walker Tompkins   December 03, 2022   14:04

## 2022-12-03 NOTE — ED INITIAL ASSESSMENT (HPI)
Pt presents to Ed per EMS for hypotension. Pt just discharged from [facility] @ 1600 today with diagnosis of viral pneumonia. Per EMS, facility reported patient has been hypotensive and has inability to ambulate.

## 2022-12-03 NOTE — CM/SW NOTE
[facility] for [facility]. Plan is to dc to NH at dc- Unclear which facility? ?     Cathy Brock LCSW  /Discharge Planner

## 2022-12-03 NOTE — PLAN OF CARE
NURSING ADMISSION NOTE      Patient admitted via Cart  Oriented to room. Safety precautions initiated. Bed in low position. Call light in reach. Received patient from ED via cart with wife at bedside. Patient nonverbal, wife assisted with admission assessment with  as wife speaks some [place], patient  Tajik speaking. On room air, breath sounds diminished. Fall precautions as he had a fall 2 days ago and again yesterday. On tele, SR. No c/o dizziness or pain.

## 2022-12-03 NOTE — PLAN OF CARE
Pt alert to name. Cooperative w/ care  Family @ bedside. Participating & helpful with plan of care & translating  Per son, pt & family would prefer son to be source of interpretation   H/o stomach removal.   Pt @ baseline of small frequent meals & snacks  Soft bm x 2 this shift. Incont of b&b. Wearing adult briefs. Denies n&v or pain  Resting comfortably in bed  [facility] liaison & pa here to see pt this am    Spoke with son this am.   Son states that when pt is discharged home, pt will go to [facility]. Sw notified. Problem: Risk for Violence/Aggression  Goal: Absence of Violence/Aggression  Description: INTERVENTIONS:   - Identify precipitating factors for behavior   - Notify Charge RN/Provider   - Consider decreasing stimulation   - Consider distraction measures   - Consider discussion with provider regarding prn meds    - Consider JOE (Moderate Risk only)   - Consider Code Support (High Risk only)   - Consider room safety checks   - Consider restraints  Outcome: Progressing     Problem: CARDIOVASCULAR - ADULT  Goal: Maintains optimal cardiac output and hemodynamic stability  Description: INTERVENTIONS:  - Monitor vital signs, rhythm, and trends  - Monitor for bleeding, hypotension and signs of decreased cardiac output  - Evaluate effectiveness of vasoactive medications to optimize hemodynamic stability  - Monitor arterial and/or venous puncture sites for bleeding and/or hematoma  - Assess quality of pulses, skin color and temperature  - Assess for signs of decreased coronary artery perfusion - ex.  Angina  - Evaluate fluid balance, assess for edema, trend weights  Outcome: Progressing     Problem: RESPIRATORY - ADULT  Goal: Achieves optimal ventilation and oxygenation  Description: INTERVENTIONS:  - Assess for changes in respiratory status  - Assess for changes in mentation and behavior  - Position to facilitate oxygenation and minimize respiratory effort  - Oxygen supplementation based on oxygen saturation or ABGs  - Provide Smoking Cessation handout, if applicable  - Encourage broncho-pulmonary hygiene including cough, deep breathe, Incentive Spirometry  - Assess the need for suctioning and perform as needed  - Assess and instruct to report SOB or any respiratory difficulty  - Respiratory Therapy support as indicated  - Manage/alleviate anxiety  - Monitor for signs/symptoms of CO2 retention  Outcome: Progressing     Problem: GASTROINTESTINAL - ADULT  Goal: Maintains adequate nutritional intake (undernourished)  Description: INTERVENTIONS:  - Monitor percentage of each meal consumed  - Identify factors contributing to decreased intake, treat as appropriate  - Assist with meals as needed  - Monitor I&O, WT and lab values  - Obtain nutritional consult as needed  - Optimize oral hygiene and moisture  - Encourage food from home; allow for food preferences  - Enhance eating environment  Outcome: Progressing     Problem: Impaired Communication  Goal: Patient will achieve maximal communication potential  Description: Interventions:    Outcome: Progressing     Problem: PAIN - ADULT  Goal: Verbalizes/displays adequate comfort level or patient's stated pain goal  Description: INTERVENTIONS:  - Encourage pt to monitor pain and request assistance  - Assess pain using appropriate pain scale  - Administer analgesics based on type and severity of pain and evaluate response  - Implement non-pharmacological measures as appropriate and evaluate response  - Consider cultural and social influences on pain and pain management  - Manage/alleviate anxiety  - Utilize distraction and/or relaxation techniques  - Monitor for opioid side effects  - Notify MD/LIP if interventions unsuccessful or patient reports new pain  - Anticipate increased pain with activity and pre-medicate as appropriate  Outcome: Progressing     Problem: SAFETY ADULT - FALL  Goal: Free from fall injury  Description: INTERVENTIONS:  - Assess pt frequently for physical needs  - Identify cognitive and physical deficits and behaviors that affect risk of falls.   - [place] fall precautions as indicated by assessment.  - Educate pt/family on patient safety including physical limitations  - Instruct pt to call for assistance with activity based on assessment  - Modify environment to reduce risk of injury  - Provide assistive devices as appropriate  - Consider OT/PT consult to assist with strengthening/mobility  - Encourage toileting schedule  Outcome: Progressing     Problem: Patient/Family Goals  Goal: Patient/Family Long Term Goal  Description: Patient's Long Term Goal: discharge    Interventions:  - ivf's & nutrition   - See additional Care Plan goals for specific interventions  Outcome: Progressing  Goal: Patient/Family Short Term Goal  Description: Patient's Short Term Goal: discharge    Interventions:   - ivf's & nutrition  - See additional Care Plan goals for specific interventions  Outcome: Progressing

## 2022-12-04 ENCOUNTER — APPOINTMENT (OUTPATIENT)
Dept: GENERAL RADIOLOGY | Facility: HOSPITAL | Age: 82
End: 2022-12-04
Attending: STUDENT IN AN ORGANIZED HEALTH CARE EDUCATION/TRAINING PROGRAM
Payer: COMMERCIAL

## 2022-12-04 PROCEDURE — 99225 SUBSEQUENT OBSERVATION CARE: CPT | Performed by: STUDENT IN AN ORGANIZED HEALTH CARE EDUCATION/TRAINING PROGRAM

## 2022-12-04 PROCEDURE — 71045 X-RAY EXAM CHEST 1 VIEW: CPT | Performed by: STUDENT IN AN ORGANIZED HEALTH CARE EDUCATION/TRAINING PROGRAM

## 2022-12-04 PROCEDURE — 99224 SUBSEQUENT OBSERVATION CARE: CPT | Performed by: PHYSICIAN ASSISTANT

## 2022-12-04 NOTE — PLAN OF CARE
Assumed care this morning at 0730. Pt is A&OX 1 person. Pt is a Guinean speaker, Jaime Duran is able to communicate with pt in [place] Pt thinks he is in the year 1965. [place], SHORT[place]  Tele: NSR, ST   Denies having pain. Wife and son where at bedside this morning. R forearm 0.9 NS at 100mL. MD contacted for HR in the 130s, reassessment , no orders at the moment. SLP to see pt- coughing with meals. Diet changed, thicken liquids. Meds crushed and given with applesauce. Pt tolerated well. Primofit in place. Bed in lowest position, call light within reach, bed alarm on.     1600- vest restraint applied, reoriented numerous times, pt attempting to go home. 1839- pt started coughing on his food, another nurse assisted the pt, MD contacted, order placed for CXR to r/o aspiration. Problem: Risk for Violence/Aggression  Goal: Absence of Violence/Aggression  Description: INTERVENTIONS:   - Identify precipitating factors for behavior   - Notify Charge RN/Provider   - Consider decreasing stimulation   - Consider distraction measures   - Consider discussion with provider regarding prn meds    - Consider JOE (Moderate Risk only)   - Consider Code Support (High Risk only)   - Consider room safety checks   - Consider restraints  Outcome: Progressing     Problem: CARDIOVASCULAR - ADULT  Goal: Maintains optimal cardiac output and hemodynamic stability  Description: INTERVENTIONS:  - Monitor vital signs, rhythm, and trends  - Monitor for bleeding, hypotension and signs of decreased cardiac output  - Evaluate effectiveness of vasoactive medications to optimize hemodynamic stability  - Monitor arterial and/or venous puncture sites for bleeding and/or hematoma  - Assess quality of pulses, skin color and temperature  - Assess for signs of decreased coronary artery perfusion - ex.  Angina  - Evaluate fluid balance, assess for edema, trend weights  Outcome: Progressing     Problem: RESPIRATORY - ADULT  Goal: Achieves optimal ventilation and oxygenation  Description: INTERVENTIONS:  - Assess for changes in respiratory status  - Assess for changes in mentation and behavior  - Position to facilitate oxygenation and minimize respiratory effort  - Oxygen supplementation based on oxygen saturation or ABGs  - Provide Smoking Cessation handout, if applicable  - Encourage broncho-pulmonary hygiene including cough, deep breathe, Incentive Spirometry  - Assess the need for suctioning and perform as needed  - Assess and instruct to report SOB or any respiratory difficulty  - Respiratory Therapy support as indicated  - Manage/alleviate anxiety  - Monitor for signs/symptoms of CO2 retention  Outcome: Progressing     Problem: Impaired Communication  Goal: Patient will achieve maximal communication potential  Description: Interventions:  - Ask \"yes/no\" questions to facilitate patient's ability to communicate wants and needs  Outcome: Progressing     Problem: PAIN - ADULT  Goal: Verbalizes/displays adequate comfort level or patient's stated pain goal  Description: INTERVENTIONS:  - Encourage pt to monitor pain and request assistance  - Assess pain using appropriate pain scale  - Administer analgesics based on type and severity of pain and evaluate response  - Implement non-pharmacological measures as appropriate and evaluate response  - Consider cultural and social influences on pain and pain management  - Manage/alleviate anxiety  - Utilize distraction and/or relaxation techniques  - Monitor for opioid side effects  - Notify MD/LIP if interventions unsuccessful or patient reports new pain  - Anticipate increased pain with activity and pre-medicate as appropriate  Outcome: Progressing     Problem: GASTROINTESTINAL - ADULT  Goal: Maintains adequate nutritional intake (undernourished)  Description: INTERVENTIONS:  - Monitor percentage of each meal consumed  - Identify factors contributing to decreased intake, treat as appropriate  - Assist with meals as needed  - Monitor I&O, WT and lab values  - Obtain nutritional consult as needed  - Optimize oral hygiene and moisture  - Encourage food from home; allow for food preferences  - Enhance eating environment  Outcome: Progressing

## 2022-12-04 NOTE — PLAN OF CARE
Assumed care at [address]  A/Ox1, on room air, NSR on tele  Belarusian speaking only, family requests to translate for patient   Isolation to rule out cdiff, stool sample still needed  VTE prophylaxis with heparin  Cardiac electrolyte protocol  IV to right forearm infusing 0.9 nacl at 100 ml/hr  PRN haldol given  No complaints of pain  Patient not sleeping, attempting to get out of bed  PRN xanax given per mar  Patient/family updated on plan of care  All needs met at this time     Problem: Risk for Violence/Aggression  Goal: Absence of Violence/Aggression  Description: INTERVENTIONS:   - Identify precipitating factors for behavior   - Notify Charge RN/Provider   - Consider decreasing stimulation   - Consider distraction measures   - Consider discussion with provider regarding prn meds    - Consider JOE (Moderate Risk only)   - Consider Code Support (High Risk only)   - Consider room safety checks   - Consider restraints  Outcome: Progressing     Problem: CARDIOVASCULAR - ADULT  Goal: Maintains optimal cardiac output and hemodynamic stability  Description: INTERVENTIONS:  - Monitor vital signs, rhythm, and trends  - Monitor for bleeding, hypotension and signs of decreased cardiac output  - Evaluate effectiveness of vasoactive medications to optimize hemodynamic stability  - Monitor arterial and/or venous puncture sites for bleeding and/or hematoma  - Assess quality of pulses, skin color and temperature  - Assess for signs of decreased coronary artery perfusion - ex.  Angina  - Evaluate fluid balance, assess for edema, trend weights  Outcome: Progressing     Problem: RESPIRATORY - ADULT  Goal: Achieves optimal ventilation and oxygenation  Description: INTERVENTIONS:  - Assess for changes in respiratory status  - Assess for changes in mentation and behavior  - Position to facilitate oxygenation and minimize respiratory effort  - Oxygen supplementation based on oxygen saturation or ABGs  - Provide Smoking Cessation handout, if applicable  - Encourage broncho-pulmonary hygiene including cough, deep breathe, Incentive Spirometry  - Assess the need for suctioning and perform as needed  - Assess and instruct to report SOB or any respiratory difficulty  - Respiratory Therapy support as indicated  - Manage/alleviate anxiety  - Monitor for signs/symptoms of CO2 retention  Outcome: Progressing     Problem: PAIN - ADULT  Goal: Verbalizes/displays adequate comfort level or patient's stated pain goal  Description: INTERVENTIONS:  - Encourage pt to monitor pain and request assistance  - Assess pain using appropriate pain scale  - Administer analgesics based on type and severity of pain and evaluate response  - Implement non-pharmacological measures as appropriate and evaluate response  - Consider cultural and social influences on pain and pain management  - Manage/alleviate anxiety  - Utilize distraction and/or relaxation techniques  - Monitor for opioid side effects  - Notify MD/LIP if interventions unsuccessful or patient reports new pain  - Anticipate increased pain with activity and pre-medicate as appropriate  Outcome: Progressing     Problem: SAFETY ADULT - FALL  Goal: Free from fall injury  Description: INTERVENTIONS:  - Assess pt frequently for physical needs  - Identify cognitive and physical deficits and behaviors that affect risk of falls.   - English fall precautions as indicated by assessment.  - Educate pt/family on patient safety including physical limitations  - Instruct pt to call for assistance with activity based on assessment  - Modify environment to reduce risk of injury  - Provide assistive devices as appropriate  - Consider OT/PT consult to assist with strengthening/mobility  - Encourage toileting schedule  Outcome: Progressing      Problem: GASTROINTESTINAL - ADULT  Goal: Maintains adequate nutritional intake (undernourished)  Description: INTERVENTIONS:  - Monitor percentage of each meal consumed  - Identify factors contributing to decreased intake, treat as appropriate  - Assist with meals as needed  - Monitor I&O, WT and lab values  - Obtain nutritional consult as needed  - Optimize oral hygiene and moisture  - Encourage food from home; allow for food preferences  - Enhance eating environment  Outcome: Progressing     Problem: Impaired Communication  Goal: Patient will achieve maximal communication potential  Description: Interventions:  - Encourage use of alternative/augmentative communication to express basic wants and needs (use of communication/letter board/or smartphone/tablet/handwriting)  Outcome: Progressing

## 2022-12-05 ENCOUNTER — APPOINTMENT (OUTPATIENT)
Dept: CT IMAGING | Facility: HOSPITAL | Age: 82
End: 2022-12-05
Attending: STUDENT IN AN ORGANIZED HEALTH CARE EDUCATION/TRAINING PROGRAM
Payer: COMMERCIAL

## 2022-12-05 PROBLEM — Z51.5 PALLIATIVE CARE ENCOUNTER: Status: ACTIVE | Noted: 2022-12-05

## 2022-12-05 PROBLEM — Z71.89 GOALS OF CARE, COUNSELING/DISCUSSION: Status: ACTIVE | Noted: 2022-12-05

## 2022-12-05 PROBLEM — Z71.89 ADVANCE CARE PLANNING: Status: ACTIVE | Noted: 2022-12-05

## 2022-12-05 PROCEDURE — 70450 CT HEAD/BRAIN W/O DYE: CPT | Performed by: STUDENT IN AN ORGANIZED HEALTH CARE EDUCATION/TRAINING PROGRAM

## 2022-12-05 PROCEDURE — 99220 INITIAL OBSERVATION CARE,LEVL III: CPT | Performed by: NURSE PRACTITIONER

## 2022-12-05 PROCEDURE — 99225 SUBSEQUENT OBSERVATION CARE: CPT | Performed by: OTHER

## 2022-12-05 PROCEDURE — 99225 SUBSEQUENT OBSERVATION CARE: CPT | Performed by: STUDENT IN AN ORGANIZED HEALTH CARE EDUCATION/TRAINING PROGRAM

## 2022-12-05 PROCEDURE — 99497 ADVNCD CARE PLAN 30 MIN: CPT | Performed by: NURSE PRACTITIONER

## 2022-12-05 RX ORDER — HALOPERIDOL 2 MG/1
2 TABLET ORAL EVERY 4 HOURS PRN
Status: DISCONTINUED | OUTPATIENT
Start: 2022-12-05 | End: 2022-12-06

## 2022-12-05 RX ORDER — HALOPERIDOL 5 MG/ML
2 INJECTION INTRAMUSCULAR EVERY 4 HOURS PRN
Status: DISCONTINUED | OUTPATIENT
Start: 2022-12-05 | End: 2022-12-10

## 2022-12-05 NOTE — CM/SW NOTE
HCPOA on file lists spouse as 1st contact. MSW called Spouse using Occitan language line (ID: [id number]) ; wife stated she was going to meet with [address] today. Call disconnected, MSW waited on hold for greater than 10 minutes with no . MSW will reach out again later to confirm plan that family wants a  New sandee Hogan Marie at [facility]. Pt was at [facility] before being admitted to [facility]. MSW stopped by demetria, Rn updated MSW that wife wants MSW to f/u with her son. MSW called son Deric, and he wants pt to dc to [facility]. We discussed that if pt can not come off restraints pt will need to dc home with family or family pay for a caregiver at a snf. Son also discussed Hospice vs. Peg tube, NG. He states family is still discussing this but they do not want a Peg. Family asking to meet with Hospice. MSW will page MD for orders.

## 2022-12-05 NOTE — PLAN OF CARE
Assumed care at [address]  A/Ox1, on room air, NSR on tele  Polish speaking only, family requests to translate for patient   VTE prophylaxis with heparin  Cardiac electrolyte protocol  IV to right forearm infusing 0.9 nacl at 100 ml/hr  No complaints of pain  Patient/family updated on plan of care  All needs met at this time     Problem: Risk for Violence/Aggression  Goal: Absence of Violence/Aggression  Description: INTERVENTIONS:   - Identify precipitating factors for behavior   - Notify Charge RN/Provider   - Consider decreasing stimulation   - Consider distraction measures   - Consider discussion with provider regarding prn meds    - Consider JOE (Moderate Risk only)   - Consider Code Support (High Risk only)   - Consider room safety checks   - Consider restraints  Outcome: Progressing     Problem: CARDIOVASCULAR - ADULT  Goal: Maintains optimal cardiac output and hemodynamic stability  Description: INTERVENTIONS:  - Monitor vital signs, rhythm, and trends  - Monitor for bleeding, hypotension and signs of decreased cardiac output  - Evaluate effectiveness of vasoactive medications to optimize hemodynamic stability  - Monitor arterial and/or venous puncture sites for bleeding and/or hematoma  - Assess quality of pulses, skin color and temperature  - Assess for signs of decreased coronary artery perfusion - ex.  Angina  - Evaluate fluid balance, assess for edema, trend weights  Outcome: Progressing     Problem: RESPIRATORY - ADULT  Goal: Achieves optimal ventilation and oxygenation  Description: INTERVENTIONS:  - Assess for changes in respiratory status  - Assess for changes in mentation and behavior  - Position to facilitate oxygenation and minimize respiratory effort  - Oxygen supplementation based on oxygen saturation or ABGs  - Provide Smoking Cessation handout, if applicable  - Encourage broncho-pulmonary hygiene including cough, deep breathe, Incentive Spirometry  - Assess the need for suctioning and perform as needed  - Assess and instruct to report SOB or any respiratory difficulty  - Respiratory Therapy support as indicated  - Manage/alleviate anxiety  - Monitor for signs/symptoms of CO2 retention  Outcome: Progressing     Problem: PAIN - ADULT  Goal: Verbalizes/displays adequate comfort level or patient's stated pain goal  Description: INTERVENTIONS:  - Encourage pt to monitor pain and request assistance  - Assess pain using appropriate pain scale  - Administer analgesics based on type and severity of pain and evaluate response  - Implement non-pharmacological measures as appropriate and evaluate response  - Consider cultural and social influences on pain and pain management  - Manage/alleviate anxiety  - Utilize distraction and/or relaxation techniques  - Monitor for opioid side effects  - Notify MD/LIP if interventions unsuccessful or patient reports new pain  - Anticipate increased pain with activity and pre-medicate as appropriate  Outcome: Progressing     Problem: SAFETY ADULT - FALL  Goal: Free from fall injury  Description: INTERVENTIONS:  - Assess pt frequently for physical needs  - Identify cognitive and physical deficits and behaviors that affect risk of falls.   - [place] fall precautions as indicated by assessment.  - Educate pt/family on patient safety including physical limitations  - Instruct pt to call for assistance with activity based on assessment  - Modify environment to reduce risk of injury  - Provide assistive devices as appropriate  - Consider OT/PT consult to assist with strengthening/mobility  - Encourage toileting schedule  Outcome: Progressing      Problem: GASTROINTESTINAL - ADULT  Goal: Maintains adequate nutritional intake (undernourished)  Description: INTERVENTIONS:  - Monitor percentage of each meal consumed  - Identify factors contributing to decreased intake, treat as appropriate  - Assist with meals as needed  - Monitor I&O, WT and lab values  - Obtain nutritional consult as needed  - Optimize oral hygiene and moisture  - Encourage food from home; allow for food preferences  - Enhance eating environment  Outcome: Progressing     Problem: Impaired Communication  Goal: Patient will achieve maximal communication potential  Description: Interventions:  - Encourage use of alternative/augmentative communication to express basic wants and needs (use of communication/letter board/or smartphone/tablet/handwriting)  Outcome: Progressing     Problem: Safety Risk - Non-Violent Restraints  Goal: Patient will remain free from self-harm  Description: INTERVENTIONS:  - Apply the least restrictive restraint to prevent harm  - Notify patient and family of reasons restraints applied  - Assess for any contributing factors to confusion (electrolyte disturbances, delirium, medications)  - Discontinue any unnecessary medical devices as soon as possible  - Assess the patient's physical comfort, circulation, skin condition, hydration, nutrition and elimination needs   - Reorient and redirection as needed  - Assess for the need to continue restraints  Outcome: Progressing

## 2022-12-06 PROCEDURE — 99225 SUBSEQUENT OBSERVATION CARE: CPT | Performed by: OTHER

## 2022-12-06 PROCEDURE — 99225 SUBSEQUENT OBSERVATION CARE: CPT | Performed by: STUDENT IN AN ORGANIZED HEALTH CARE EDUCATION/TRAINING PROGRAM

## 2022-12-06 RX ORDER — QUETIAPINE FUMARATE 25 MG/1
50 TABLET, FILM COATED ORAL 3 TIMES DAILY PRN
Status: DISCONTINUED | OUTPATIENT
Start: 2022-12-06 | End: 2022-12-10

## 2022-12-06 RX ORDER — QUETIAPINE FUMARATE 25 MG/1
25 TABLET, FILM COATED ORAL 3 TIMES DAILY PRN
Status: DISCONTINUED | OUTPATIENT
Start: 2022-12-06 | End: 2022-12-10

## 2022-12-06 NOTE — PLAN OF CARE
Pt is A&O x1. Ethiopian speaking only. Pt very impulsive and irritable this shift. PRN haldol given x1. Pt constantly trying to climb out of bed and pull off heart monitor and primofit despite posey vest. MD notified- obtained order for soft wrist restraints. IVF- 0.9NS @ 100mL/hr infusing to right forearm PIV. NPO pending SLP eval. SubQ heparin for VTE proph. Palliative care following case. Hospice to set up meeting with family. Problem: Risk for Violence/Aggression  Goal: Absence of Violence/Aggression  Description: INTERVENTIONS:   - Identify precipitating factors for behavior   - Notify Charge RN/Provider   - Consider decreasing stimulation   - Consider distraction measures   - Consider discussion with provider regarding prn meds    - Consider JOE (Moderate Risk only)   - Consider Code Support (High Risk only)   - Consider room safety checks   - Consider restraints  Outcome: Progressing     Problem: CARDIOVASCULAR - ADULT  Goal: Maintains optimal cardiac output and hemodynamic stability  Description: INTERVENTIONS:  - Monitor vital signs, rhythm, and trends  - Monitor for bleeding, hypotension and signs of decreased cardiac output  - Evaluate effectiveness of vasoactive medications to optimize hemodynamic stability  - Monitor arterial and/or venous puncture sites for bleeding and/or hematoma  - Assess quality of pulses, skin color and temperature  - Assess for signs of decreased coronary artery perfusion - ex.  Angina  - Evaluate fluid balance, assess for edema, trend weights  Outcome: Progressing     Problem: RESPIRATORY - ADULT  Goal: Achieves optimal ventilation and oxygenation  Description: INTERVENTIONS:  - Assess for changes in respiratory status  - Assess for changes in mentation and behavior  - Position to facilitate oxygenation and minimize respiratory effort  - Oxygen supplementation based on oxygen saturation or ABGs  - Provide Smoking Cessation handout, if applicable  - Encourage broncho-pulmonary hygiene including cough, deep breathe, Incentive Spirometry  - Assess the need for suctioning and perform as needed  - Assess and instruct to report SOB or any respiratory difficulty  - Respiratory Therapy support as indicated  - Manage/alleviate anxiety  - Monitor for signs/symptoms of CO2 retention  Outcome: Progressing     Problem: PAIN - ADULT  Goal: Verbalizes/displays adequate comfort level or patient's stated pain goal  Description: INTERVENTIONS:  - Encourage pt to monitor pain and request assistance  - Assess pain using appropriate pain scale  - Administer analgesics based on type and severity of pain and evaluate response  - Implement non-pharmacological measures as appropriate and evaluate response  - Consider cultural and social influences on pain and pain management  - Manage/alleviate anxiety  - Utilize distraction and/or relaxation techniques  - Monitor for opioid side effects  - Notify MD/LIP if interventions unsuccessful or patient reports new pain  - Anticipate increased pain with activity and pre-medicate as appropriate  Outcome: Progressing     Problem: SAFETY ADULT - FALL  Goal: Free from fall injury  Description: INTERVENTIONS:  - Assess pt frequently for physical needs  - Identify cognitive and physical deficits and behaviors that affect risk of falls.   - [place] fall precautions as indicated by assessment.  - Educate pt/family on patient safety including physical limitations  - Instruct pt to call for assistance with activity based on assessment  - Modify environment to reduce risk of injury  - Provide assistive devices as appropriate  - Consider OT/PT consult to assist with strengthening/mobility  - Encourage toileting schedule  Outcome: Progressing       Problem: GASTROINTESTINAL - ADULT  Goal: Maintains adequate nutritional intake (undernourished)  Description: INTERVENTIONS:  - Monitor percentage of each meal consumed  - Identify factors contributing to decreased intake, treat as appropriate  - Assist with meals as needed  - Monitor I&O, WT and lab values  - Obtain nutritional consult as needed  - Optimize oral hygiene and moisture  - Encourage food from home; allow for food preferences  - Enhance eating environment  Outcome: Progressing     Problem: Impaired Communication  Goal: Patient will achieve maximal communication potential  Description: Interventions:  Outcome: Progressing     Problem: Safety Risk - Non-Violent Restraints  Goal: Patient will remain free from self-harm  Description: INTERVENTIONS:  - Apply the least restrictive restraint to prevent harm  - Notify patient and family of reasons restraints applied  - Assess for any contributing factors to confusion (electrolyte disturbances, delirium, medications)  - Discontinue any unnecessary medical devices as soon as possible  - Assess the patient's physical comfort, circulation, skin condition, hydration, nutrition and elimination needs   - Reorient and redirection as needed  - Assess for the need to continue restraints  Outcome: Progressing

## 2022-12-06 NOTE — CM/SW NOTE
Called and left message for son Pramod Strauss. Called and spoke with Victorino Leos will be coming by later, Pramod Strauss is poa . Wife at bedside. Will schedule time when son returns call. Son present, will be meeting shortly    Met with son to discuss hospice services and goals of care. Son shared history of illness and progressions of dementia. Discussed hospice and quality of life and what his father and rest of family would want. Son wants to  Take this information , speak with dr about why he is improving and if it is anticipated to remain or go up and down (I did indicate that with dementia /alzheimers , we do see an up and down progression) and discuss decision with family. Residential to follow up tomorrow.

## 2022-12-06 NOTE — PLAN OF CARE
Assumed patient care at 0730. Pt is A&OX1, person, pt was lethargic this morning. SHORTY [place]IN[place]  Tele: NSR  SLP was contacted as pt had trouble swallowing. Pt is NPO. Pt had worsening dysphagia, CT ordered to r/o stroke, CT (-) for bleeding. Received pt on vest restraints, vest restraints discontinue at 1200. Palliative care consulted. Wife was present at bedside. Bed in lowest position, call light within reach, bed alarm on. Vest restraint started at [place] [address] d/t pt attempting to get out of bed multiples times. Spouse notified. Problem: Risk for Violence/Aggression  Goal: Absence of Violence/Aggression  Description: INTERVENTIONS:   - Identify precipitating factors for behavior   - Notify Charge RN/Provider   - Consider decreasing stimulation   - Consider distraction measures   - Consider discussion with provider regarding prn meds    - Consider JOE (Moderate Risk only)   - Consider Code Support (High Risk only)   - Consider room safety checks   - Consider restraints  Outcome: Progressing     Problem: CARDIOVASCULAR - ADULT  Goal: Maintains optimal cardiac output and hemodynamic stability  Description: INTERVENTIONS:  - Monitor vital signs, rhythm, and trends  - Monitor for bleeding, hypotension and signs of decreased cardiac output  - Evaluate effectiveness of vasoactive medications to optimize hemodynamic stability  - Monitor arterial and/or venous puncture sites for bleeding and/or hematoma  - Assess quality of pulses, skin color and temperature  - Assess for signs of decreased coronary artery perfusion - ex.  Angina  - Evaluate fluid balance, assess for edema, trend weights  Outcome: Progressing     Problem: RESPIRATORY - ADULT  Goal: Achieves optimal ventilation and oxygenation  Description: INTERVENTIONS:  - Assess for changes in respiratory status  - Assess for changes in mentation and behavior  - Position to facilitate oxygenation and minimize respiratory effort  - Oxygen supplementation based on oxygen saturation or ABGs  - Provide Smoking Cessation handout, if applicable  - Encourage broncho-pulmonary hygiene including cough, deep breathe, Incentive Spirometry  - Assess the need for suctioning and perform as needed  - Assess and instruct to report SOB or any respiratory difficulty  - Respiratory Therapy support as indicated  - Manage/alleviate anxiety  - Monitor for signs/symptoms of CO2 retention  Outcome: Progressing     Problem: GASTROINTESTINAL - ADULT  Goal: Maintains adequate nutritional intake (undernourished)  Description: INTERVENTIONS:  - Monitor percentage of each meal consumed  - Identify factors contributing to decreased intake, treat as appropriate  - Assist with meals as needed  - Monitor I&O, WT and lab values  - Obtain nutritional consult as needed  - Optimize oral hygiene and moisture  - Encourage food from home; allow for food preferences  - Enhance eating environment  Outcome: Progressing     Problem: Impaired Communication  Goal: Patient will achieve maximal communication potential  Description: Interventions:  - Ask \"yes/no\" questions to facilitate patient's ability to communicate wants and needs  Outcome: Progressing     Problem: SAFETY ADULT - FALL  Goal: Free from fall injury  Description: INTERVENTIONS:  - Assess pt frequently for physical needs  - Identify cognitive and physical deficits and behaviors that affect risk of falls.   - [place] fall precautions as indicated by assessment.  - Educate pt/family on patient safety including physical limitations  - Instruct pt to call for assistance with activity based on assessment  - Modify environment to reduce risk of injury  - Provide assistive devices as appropriate  - Consider OT/PT consult to assist with strengthening/mobility  - Encourage toileting schedule  Outcome: Progressing     Problem: Safety Risk - Non-Violent Restraints  Goal: Patient will remain free from self-harm  Description: INTERVENTIONS:  - Apply the least restrictive restraint to prevent harm  - Notify patient and family of reasons restraints applied  - Assess for any contributing factors to confusion (electrolyte disturbances, delirium, medications)  - Discontinue any unnecessary medical devices as soon as possible  - Assess the patient's physical comfort, circulation, skin condition, hydration, nutrition and elimination needs   - Reorient and redirection as needed  - Assess for the need to continue restraints  Outcome: Progressing

## 2022-12-06 NOTE — PLAN OF CARE
A/OX1, Hungarian speaking, on RA, no SOB, NSR on tele, denies pain, afebrile. The patient appears calm, does not try to get out of the bed, son is at the bedside. Restrains dc'd. Speech re-eval completed. Diet orders received. Good appetite noted. IVF - 0.9 NaCl @ 100ml/hr. Safety precautions in place. Staff will continue to monitor. 1700 - after the wife left, the patient becomes restless and tries to get out of bed despite redirections, the patient attempts to disconect medical equipment. The patient is non compliment with safety instructions and does not follow the commands. Hospitalist notified, orders for restrains recived, posey and wrist restrains applied. Problem: Risk for Violence/Aggression  Goal: Absence of Violence/Aggression  Description: INTERVENTIONS:   - Identify precipitating factors for behavior   - Notify Charge RN/Provider   - Consider decreasing stimulation   - Consider distraction measures   - Consider discussion with provider regarding prn meds    - Consider JOE (Moderate Risk only)   - Consider Code Support (High Risk only)   - Consider room safety checks   - Consider restraints  Outcome: Progressing     Problem: CARDIOVASCULAR - ADULT  Goal: Maintains optimal cardiac output and hemodynamic stability  Description: INTERVENTIONS:  - Monitor vital signs, rhythm, and trends  - Monitor for bleeding, hypotension and signs of decreased cardiac output  - Evaluate effectiveness of vasoactive medications to optimize hemodynamic stability  - Monitor arterial and/or venous puncture sites for bleeding and/or hematoma  - Assess quality of pulses, skin color and temperature  - Assess for signs of decreased coronary artery perfusion - ex.  Angina  - Evaluate fluid balance, assess for edema, trend weights  Outcome: Progressing     Problem: RESPIRATORY - ADULT  Goal: Achieves optimal ventilation and oxygenation  Description: INTERVENTIONS:  - Assess for changes in respiratory status  - Assess for changes in mentation and behavior  - Position to facilitate oxygenation and minimize respiratory effort  - Oxygen supplementation based on oxygen saturation or ABGs  - Provide Smoking Cessation handout, if applicable  - Encourage broncho-pulmonary hygiene including cough, deep breathe, Incentive Spirometry  - Assess the need for suctioning and perform as needed  - Assess and instruct to report SOB or any respiratory difficulty  - Respiratory Therapy support as indicated  - Manage/alleviate anxiety  - Monitor for signs/symptoms of CO2 retention  Outcome: Progressing     Problem: GASTROINTESTINAL - ADULT  Goal: Maintains adequate nutritional intake (undernourished)  Description: INTERVENTIONS:  - Monitor percentage of each meal consumed  - Identify factors contributing to decreased intake, treat as appropriate  - Assist with meals as needed  - Monitor I&O, WT and lab values  - Obtain nutritional consult as needed  - Optimize oral hygiene and moisture  - Encourage food from home; allow for food preferences  - Enhance eating environment  Outcome: Progressing     Problem: Impaired Communication  Goal: Patient will achieve maximal communication potential  Description: Interventions:    Outcome: Progressing     Problem: PAIN - ADULT  Goal: Verbalizes/displays adequate comfort level or patient's stated pain goal  Description: INTERVENTIONS:  - Encourage pt to monitor pain and request assistance  - Assess pain using appropriate pain scale  - Administer analgesics based on type and severity of pain and evaluate response  - Implement non-pharmacological measures as appropriate and evaluate response  - Consider cultural and social influences on pain and pain management  - Manage/alleviate anxiety  - Utilize distraction and/or relaxation techniques  - Monitor for opioid side effects  - Notify MD/LIP if interventions unsuccessful or patient reports new pain  - Anticipate increased pain with activity and pre-medicate as appropriate  Outcome: Progressing     Problem: SAFETY ADULT - FALL  Goal: Free from fall injury  Description: INTERVENTIONS:  - Assess pt frequently for physical needs  - Identify cognitive and physical deficits and behaviors that affect risk of falls.   - [place] fall precautions as indicated by assessment.  - Educate pt/family on patient safety including physical limitations  - Instruct pt to call for assistance with activity based on assessment  - Modify environment to reduce risk of injury  - Provide assistive devices as appropriate  - Consider OT/PT consult to assist with strengthening/mobility  - Encourage toileting schedule  Outcome: Progressing     Problem: Patient/Family Goals  Goal: Patient/Family Long Term Goal  Description: Patient's Long Term Goal: dc    Interventions:  - medications  - See additional Care Plan goals for specific interventions  Outcome: Progressing  Goal: Patient/Family Short Term Goal  Description: Patient's Short Term Goal: safety    Interventions:   - frequent rounding   - See additional Care Plan goals for specific interventions  Outcome: Progressing     Problem: Safety Risk - Non-Violent Restraints  Goal: Patient will remain free from self-harm  Description: INTERVENTIONS:  - Apply the least restrictive restraint to prevent harm  - Notify patient and family of reasons restraints applied  - Assess for any contributing factors to confusion (electrolyte disturbances, delirium, medications)  - Discontinue any unnecessary medical devices as soon as possible  - Assess the patient's physical comfort, circulation, skin condition, hydration, nutrition and elimination needs   - Reorient and redirection as needed  - Assess for the need to continue restraints  Outcome: Progressing

## 2022-12-07 PROCEDURE — 99497 ADVNCD CARE PLAN 30 MIN: CPT | Performed by: HOSPITALIST

## 2022-12-07 PROCEDURE — 99225 SUBSEQUENT OBSERVATION CARE: CPT | Performed by: OTHER

## 2022-12-07 PROCEDURE — 99225 SUBSEQUENT OBSERVATION CARE: CPT | Performed by: HOSPITALIST

## 2022-12-07 RX ORDER — RUFINAMIDE 40 MG/ML
1 SUSPENSION ORAL DAILY
Status: DISCONTINUED | OUTPATIENT
Start: 2022-12-08 | End: 2022-12-10

## 2022-12-07 NOTE — PROGRESS NOTES
Received call from son Krystyna Gustafson with request to initiate hospice enrollment. He has questions about finding the best possible placement for his father to have hospice care. They are exploring alternatives to previous sites. Will involve  for support with placement at [place]. I notified [facility] hospice of family's request to f/u for enrollment process. Will sign off as GOC is to pursue comfort care with hospice.     Brittanie MILLAN  Palliative Care    12/7/2022  12:01 PM

## 2022-12-07 NOTE — HOSPICE RN NOTE
[facility] follow up with son Markus Carmichael via phone. Voicemail left at 10:55AM. Spouse Jaimee Macario called at at 11:00AM and deferred to [facility] to speak about hospice details. Writer requested for Jaimee Macario to ask Markus Carmichael To call back [facility]. Waiting for return call to set up time for consents/discharge planning. ADDENDUM: Markus Carmichael reached out to [facility] MSW and has set up a meeting time for tomorrow at 08:30AM outside patient's room to sign hospice consents. [facility] working on discharge placement. [facility] continuing to follow up.      Elizabeth Jarquin, [facility]  [phone]  Office: [phone]

## 2022-12-07 NOTE — PROGRESS NOTES
[facility] to meet tomorrow at 830a with jadon [facility] to complete Hospice consents. Checking with [facility] to see if able to accept.

## 2022-12-07 NOTE — PLAN OF CARE
A/OX1, on RA, no SOB, NSR on tele, Vitals stable. Posey and wrist restrains are on. The patient continues to try to get out of bed despite redirections. Per nursing report, the patient was up all night and did not sleep. Family is at the bedside now. The patient tolerates the diet. IVF - 0.9 NaCl @ 100 ml/hr. Family has decided to pursue hospice, hospice is on consult. The patient had one episode of diarrhea, hospitalist notified, orders received to continue to monitor the patient. Diet modified to Necta thick liquids per Speech. Safety precautions in place. Staff will continue to monitor. Problem: Risk for Violence/Aggression  Goal: Absence of Violence/Aggression  Description: INTERVENTIONS:   - Identify precipitating factors for behavior   - Notify Charge RN/Provider   - Consider decreasing stimulation   - Consider distraction measures   - Consider discussion with provider regarding prn meds    - Consider JOE (Moderate Risk only)   - Consider Code Support (High Risk only)   - Consider room safety checks   - Consider restraints  Outcome: Progressing     Problem: CARDIOVASCULAR - ADULT  Goal: Maintains optimal cardiac output and hemodynamic stability  Description: INTERVENTIONS:  - Monitor vital signs, rhythm, and trends  - Monitor for bleeding, hypotension and signs of decreased cardiac output  - Evaluate effectiveness of vasoactive medications to optimize hemodynamic stability  - Monitor arterial and/or venous puncture sites for bleeding and/or hematoma  - Assess quality of pulses, skin color and temperature  - Assess for signs of decreased coronary artery perfusion - ex.  Angina  - Evaluate fluid balance, assess for edema, trend weights  Outcome: Progressing     Problem: RESPIRATORY - ADULT  Goal: Achieves optimal ventilation and oxygenation  Description: INTERVENTIONS:  - Assess for changes in respiratory status  - Assess for changes in mentation and behavior  - Position to facilitate oxygenation and minimize respiratory effort  - Oxygen supplementation based on oxygen saturation or ABGs  - Provide Smoking Cessation handout, if applicable  - Encourage broncho-pulmonary hygiene including cough, deep breathe, Incentive Spirometry  - Assess the need for suctioning and perform as needed  - Assess and instruct to report SOB or any respiratory difficulty  - Respiratory Therapy support as indicated  - Manage/alleviate anxiety  - Monitor for signs/symptoms of CO2 retention  Outcome: Progressing     Problem: GASTROINTESTINAL - ADULT  Goal: Maintains adequate nutritional intake (undernourished)  Description: INTERVENTIONS:  - Monitor percentage of each meal consumed  - Identify factors contributing to decreased intake, treat as appropriate  - Assist with meals as needed  - Monitor I&O, WT and lab values  - Obtain nutritional consult as needed  - Optimize oral hygiene and moisture  - Encourage food from home; allow for food preferences  - Enhance eating environment  Outcome: Progressing     Problem: Impaired Communication  Goal: Patient will achieve maximal communication potential  Description: Interventions:    Outcome: Progressing     Problem: PAIN - ADULT  Goal: Verbalizes/displays adequate comfort level or patient's stated pain goal  Description: INTERVENTIONS:  - Encourage pt to monitor pain and request assistance  - Assess pain using appropriate pain scale  - Administer analgesics based on type and severity of pain and evaluate response  - Implement non-pharmacological measures as appropriate and evaluate response  - Consider cultural and social influences on pain and pain management  - Manage/alleviate anxiety  - Utilize distraction and/or relaxation techniques  - Monitor for opioid side effects  - Notify MD/LIP if interventions unsuccessful or patient reports new pain  - Anticipate increased pain with activity and pre-medicate as appropriate  Outcome: Progressing     Problem: SAFETY ADULT - FALL  Goal: Free from fall injury  Description: INTERVENTIONS:  - Assess pt frequently for physical needs  - Identify cognitive and physical deficits and behaviors that affect risk of falls.   - [place] fall precautions as indicated by assessment.  - Educate pt/family on patient safety including physical limitations  - Instruct pt to call for assistance with activity based on assessment  - Modify environment to reduce risk of injury  - Provide assistive devices as appropriate  - Consider OT/PT consult to assist with strengthening/mobility  - Encourage toileting schedule  Outcome: Progressing     Problem: Patient/Family Goals  Goal: Patient/Family Long Term Goal  Description: Patient's Long Term Goal: dc    Interventions:  - medications   - See additional Care Plan goals for specific interventions  Outcome: Progressing  Goal: Patient/Family Short Term Goal  Description: Patient's Short Term Goal: safety     Interventions:   - frequent rounding   - See additional Care Plan goals for specific interventions  Outcome: Progressing     Problem: Safety Risk - Non-Violent Restraints  Goal: Patient will remain free from self-harm  Description: INTERVENTIONS:  - Apply the least restrictive restraint to prevent harm  - Notify patient and family of reasons restraints applied  - Assess for any contributing factors to confusion (electrolyte disturbances, delirium, medications)  - Discontinue any unnecessary medical devices as soon as possible  - Assess the patient's physical comfort, circulation, skin condition, hydration, nutrition and elimination needs   - Reorient and redirection as needed  - Assess for the need to continue restraints  Outcome: Progressing

## 2022-12-07 NOTE — PLAN OF CARE
Pt is A&O x1. Georgian speaking only. Pt very impulsive and restless. Posey and soft wrist restraints in place. VSS- NSR/ST with PVCs on tele. IVF- 0.9NS @ 100mL/hr infusing to right forearm PIV. SLP cleared pt for soft diet with thin liquids. Only ate about 25% of his dinner. SubQ heparin for VTE proph. Blanchable redness to sacrum- mepilex and pillow support in place. Palliative care following case. Problem: Risk for Violence/Aggression  Goal: Absence of Violence/Aggression  Description: INTERVENTIONS:   - Identify precipitating factors for behavior   - Notify Charge RN/Provider   - Consider decreasing stimulation   - Consider distraction measures   - Consider discussion with provider regarding prn meds    - Consider JOE (Moderate Risk only)   - Consider Code Support (High Risk only)   - Consider room safety checks   - Consider restraints  Outcome: Progressing     Problem: CARDIOVASCULAR - ADULT  Goal: Maintains optimal cardiac output and hemodynamic stability  Description: INTERVENTIONS:  - Monitor vital signs, rhythm, and trends  - Monitor for bleeding, hypotension and signs of decreased cardiac output  - Evaluate effectiveness of vasoactive medications to optimize hemodynamic stability  - Monitor arterial and/or venous puncture sites for bleeding and/or hematoma  - Assess quality of pulses, skin color and temperature  - Assess for signs of decreased coronary artery perfusion - ex.  Angina  - Evaluate fluid balance, assess for edema, trend weights  Outcome: Progressing     Problem: RESPIRATORY - ADULT  Goal: Achieves optimal ventilation and oxygenation  Description: INTERVENTIONS:  - Assess for changes in respiratory status  - Assess for changes in mentation and behavior  - Position to facilitate oxygenation and minimize respiratory effort  - Oxygen supplementation based on oxygen saturation or ABGs  - Provide Smoking Cessation handout, if applicable  - Encourage broncho-pulmonary hygiene including cough, deep breathe, Incentive Spirometry  - Assess the need for suctioning and perform as needed  - Assess and instruct to report SOB or any respiratory difficulty  - Respiratory Therapy support as indicated  - Manage/alleviate anxiety  - Monitor for signs/symptoms of CO2 retention  Outcome: Progressing     Problem: PAIN - ADULT  Goal: Verbalizes/displays adequate comfort level or patient's stated pain goal  Description: INTERVENTIONS:  - Encourage pt to monitor pain and request assistance  - Assess pain using appropriate pain scale  - Administer analgesics based on type and severity of pain and evaluate response  - Implement non-pharmacological measures as appropriate and evaluate response  - Consider cultural and social influences on pain and pain management  - Manage/alleviate anxiety  - Utilize distraction and/or relaxation techniques  - Monitor for opioid side effects  - Notify MD/LIP if interventions unsuccessful or patient reports new pain  - Anticipate increased pain with activity and pre-medicate as appropriate  Outcome: Progressing     Problem: SAFETY ADULT - FALL  Goal: Free from fall injury  Description: INTERVENTIONS:  - Assess pt frequently for physical needs  - Identify cognitive and physical deficits and behaviors that affect risk of falls.   - [place] fall precautions as indicated by assessment.  - Educate pt/family on patient safety including physical limitations  - Instruct pt to call for assistance with activity based on assessment  - Modify environment to reduce risk of injury  - Provide assistive devices as appropriate  - Consider OT/PT consult to assist with strengthening/mobility  - Encourage toileting schedule  Outcome: Progressing       Problem: GASTROINTESTINAL - ADULT  Goal: Maintains adequate nutritional intake (undernourished)  Description: INTERVENTIONS:  - Monitor percentage of each meal consumed  - Identify factors contributing to decreased intake, treat as appropriate  - Assist with meals as needed  - Monitor I&O, WT and lab values  - Obtain nutritional consult as needed  - Optimize oral hygiene and moisture  - Encourage food from home; allow for food preferences  - Enhance eating environment  Outcome: Progressing     Problem: Impaired Communication  Goal: Patient will achieve maximal communication potential  Description: Interventions:  Outcome: Progressing     Problem: Safety Risk - Non-Violent Restraints  Goal: Patient will remain free from self-harm  Description: INTERVENTIONS:  - Apply the least restrictive restraint to prevent harm  - Notify patient and family of reasons restraints applied  - Assess for any contributing factors to confusion (electrolyte disturbances, delirium, medications)  - Discontinue any unnecessary medical devices as soon as possible  - Assess the patient's physical comfort, circulation, skin condition, hydration, nutrition and elimination needs   - Reorient and redirection as needed  - Assess for the need to continue restraints  Outcome: Progressing

## 2022-12-08 ENCOUNTER — APPOINTMENT (OUTPATIENT)
Dept: GENERAL RADIOLOGY | Facility: HOSPITAL | Age: 82
End: 2022-12-08
Attending: INTERNAL MEDICINE
Payer: COMMERCIAL

## 2022-12-08 ENCOUNTER — APPOINTMENT (OUTPATIENT)
Dept: CT IMAGING | Facility: HOSPITAL | Age: 82
End: 2022-12-08
Attending: INTERNAL MEDICINE
Payer: COMMERCIAL

## 2022-12-08 PROCEDURE — 99225 SUBSEQUENT OBSERVATION CARE: CPT | Performed by: OTHER

## 2022-12-08 PROCEDURE — 73560 X-RAY EXAM OF KNEE 1 OR 2: CPT | Performed by: INTERNAL MEDICINE

## 2022-12-08 PROCEDURE — 70450 CT HEAD/BRAIN W/O DYE: CPT | Performed by: INTERNAL MEDICINE

## 2022-12-08 PROCEDURE — 99225 SUBSEQUENT OBSERVATION CARE: CPT | Performed by: HOSPITALIST

## 2022-12-08 RX ORDER — QUETIAPINE FUMARATE 100 MG/1
150 TABLET, FILM COATED ORAL NIGHTLY
Qty: 45 TABLET | Refills: 0 | Status: SHIPPED | OUTPATIENT
Start: 2022-12-08

## 2022-12-08 RX ORDER — QUETIAPINE FUMARATE 50 MG/1
TABLET, FILM COATED ORAL
Qty: 60 TABLET | Refills: 0 | Status: SHIPPED | OUTPATIENT
Start: 2022-12-08

## 2022-12-08 RX ORDER — MIRTAZAPINE 15 MG/1
22.5 TABLET, FILM COATED ORAL NIGHTLY
Qty: 45 TABLET | Refills: 0 | Status: SHIPPED | OUTPATIENT
Start: 2022-12-08

## 2022-12-08 NOTE — PLAN OF CARE
Assumed care at 0730. A&O self. Posey and wrist restraints discontinued at 1000. Hospice initiated this AM.  Vietnamese speaking.  utilized. Room air. Continuous fluids discontinued on this shift. See MAR for more details. Soft nectar thick diet. Tele-NSR. [facility] plan reviewed, pt compliant with no complaints. VTE- Heparin. See MAR for administration details. Up in chair today. 1 assist with a walker. Video monitoring. Pt oriented to the room, safety prec initiated, bed is in the lowest position and call light is within reach. Pt and family updated on the plan of care and all questions answered at this time. Continued to monitor. Problem: Risk for Violence/Aggression  Goal: Absence of Violence/Aggression  Description: INTERVENTIONS:   - Identify precipitating factors for behavior   - Notify Charge RN/Provider   - Consider decreasing stimulation   - Consider distraction measures   - Consider discussion with provider regarding prn meds    - Consider JOE (Moderate Risk only)   - Consider Code Support (High Risk only)   - Consider room safety checks   - Consider restraints  Outcome: Progressing     Problem: CARDIOVASCULAR - ADULT  Goal: Maintains optimal cardiac output and hemodynamic stability  Description: INTERVENTIONS:  - Monitor vital signs, rhythm, and trends  - Monitor for bleeding, hypotension and signs of decreased cardiac output  - Evaluate effectiveness of vasoactive medications to optimize hemodynamic stability  - Monitor arterial and/or venous puncture sites for bleeding and/or hematoma  - Assess quality of pulses, skin color and temperature  - Assess for signs of decreased coronary artery perfusion - ex.  Angina  - Evaluate fluid balance, assess for edema, trend weights  Outcome: Progressing     Problem: RESPIRATORY - ADULT  Goal: Achieves optimal ventilation and oxygenation  Description: INTERVENTIONS:  - Assess for changes in respiratory status  - Assess for changes in mentation and behavior  - Position to facilitate oxygenation and minimize respiratory effort  - Oxygen supplementation based on oxygen saturation or ABGs  - Provide Smoking Cessation handout, if applicable  - Encourage broncho-pulmonary hygiene including cough, deep breathe, Incentive Spirometry  - Assess the need for suctioning and perform as needed  - Assess and instruct to report SOB or any respiratory difficulty  - Respiratory Therapy support as indicated  - Manage/alleviate anxiety  - Monitor for signs/symptoms of CO2 retention  Outcome: Progressing     Problem: GASTROINTESTINAL - ADULT  Goal: Maintains adequate nutritional intake (undernourished)  Description: INTERVENTIONS:  - Monitor percentage of each meal consumed  - Identify factors contributing to decreased intake, treat as appropriate  - Assist with meals as needed  - Monitor I&O, WT and lab values  - Obtain nutritional consult as needed  - Optimize oral hygiene and moisture  - Encourage food from home; allow for food preferences  - Enhance eating environment  Outcome: Progressing     Problem: Impaired Communication  Goal: Patient will achieve maximal communication potential  Outcome: Progressing     Problem: PAIN - ADULT  Goal: Verbalizes/displays adequate comfort level or patient's stated pain goal  Description: INTERVENTIONS:  - Encourage pt to monitor pain and request assistance  - Assess pain using appropriate pain scale  - Administer analgesics based on type and severity of pain and evaluate response  - Implement non-pharmacological measures as appropriate and evaluate response  - Consider cultural and social influences on pain and pain management  - Manage/alleviate anxiety  - Utilize distraction and/or relaxation techniques  - Monitor for opioid side effects  - Notify MD/LIP if interventions unsuccessful or patient reports new pain  - Anticipate increased pain with activity and pre-medicate as appropriate  Outcome: Progressing     Problem: SAFETY ADULT - FALL  Goal: Free from fall injury  Description: INTERVENTIONS:  - Assess pt frequently for physical needs  - Identify cognitive and physical deficits and behaviors that affect risk of falls.   - [place] fall precautions as indicated by assessment.  - Educate pt/family on patient safety including physical limitations  - Instruct pt to call for assistance with activity based on assessment  - Modify environment to reduce risk of injury  - Provide assistive devices as appropriate  - Consider OT/PT consult to assist with strengthening/mobility  - Encourage toileting schedule  Outcome: Progressing     Problem: Safety Risk - Non-Violent Restraints  Goal: Patient will remain free from self-harm  Description: INTERVENTIONS:  - Apply the least restrictive restraint to prevent harm  - Notify patient and family of reasons restraints applied  - Assess for any contributing factors to confusion (electrolyte disturbances, delirium, medications)  - Discontinue any unnecessary medical devices as soon as possible  - Assess the patient's physical comfort, circulation, skin condition, hydration, nutrition and elimination needs   - Reorient and redirection as needed  - Assess for the need to continue restraints  Outcome: Progressing

## 2022-12-08 NOTE — PROGRESS NOTES
Assumed care at [address]  NSR in tele. Lung sounds clear,diminished   Abdomen soft, non tender. Remains on soft restraints for safety,  Denies pain. !VF 0.9 NaCl at 100 ml/he. Slept well overnight. Family meeting with hospice on 12/8/22.

## 2022-12-08 NOTE — CM/SW NOTE
10am  Pt discussed with Psych MD and Rn and Hospice. Family is agreeable to [facility]. 4pm  MSW updated that [facility] will not take pt as hospice because his medicaid will not cover cost of bed. Per chart pt off restraints so we can see if he meets for sandee. Other sandee referrals sent.

## 2022-12-08 NOTE — PROGRESS NOTES
12/08/22 1202   Clinical Encounter Type   Visited With Health care provider  (As per the RN, identified that the patient is not decisional)   Routine Visit   (Responded to [facility] consult)    identified an electronic [facility] on EMR. Printed and placed a copy of patient's chart. Notified RN and Hospice SW, regarding the same. Spiritual Care support can be requested via an Epic consult. RevAnnetta Montes M.Div., M.T.S., [facility] CGCS.   Spiritual Care Lead

## 2022-12-09 LAB
C DIFF TOX B STL QL: NEGATIVE
SARS-COV-2 RNA RESP QL NAA+PROBE: NOT DETECTED

## 2022-12-09 PROCEDURE — 99225 SUBSEQUENT OBSERVATION CARE: CPT | Performed by: HOSPITALIST

## 2022-12-09 RX ORDER — MORPHINE SULFATE 4 MG/ML
4 INJECTION, SOLUTION INTRAMUSCULAR; INTRAVENOUS EVERY 2 HOUR PRN
Status: DISCONTINUED | OUTPATIENT
Start: 2022-12-09 | End: 2022-12-10

## 2022-12-09 RX ORDER — MORPHINE SULFATE 2 MG/ML
1 INJECTION, SOLUTION INTRAMUSCULAR; INTRAVENOUS EVERY 2 HOUR PRN
Status: DISCONTINUED | OUTPATIENT
Start: 2022-12-09 | End: 2022-12-10

## 2022-12-09 RX ORDER — MORPHINE SULFATE 2 MG/ML
2 INJECTION, SOLUTION INTRAMUSCULAR; INTRAVENOUS EVERY 2 HOUR PRN
Status: DISCONTINUED | OUTPATIENT
Start: 2022-12-09 | End: 2022-12-10

## 2022-12-09 NOTE — CM/SW NOTE
MSW updated by Hospice that dc plan is Hospice at [address] [place] [address]. It was approved by medicaid.

## 2022-12-09 NOTE — CM/SW NOTE
Patient will discharge to [facility] tomorrow at 3:30 by [facility]. He will be seen by [facility] RN when he arrives at the facility. PCS form completed. Family notified and Dot Deion and AMIRA notified. Jarad Tee LCSW    MSW left a message for daughter America Boone and son Polina Speaks.

## 2022-12-09 NOTE — PLAN OF CARE
Significant Event - Fall Note    Date/Time of Fall: December 8, 2022 at 2030    Fall huddle completed: Yes    Description of patient fall:     Patient fell from: Bed     Activity when fall occurred: Unknown     Where did fall occur: Patient room     Was the fall assisted: Found on floor/unassisted to floor    Who witnessed the fall: Unwitnessed    Patient narrative of fall: RAHEL patient narrative of fall due to confusion/.    Staff narrative of fall: 2030, PCT was rounding to complete vitals and found pt on the floor, on hands and knees. Patient was assessed, no swelling/redness/sign of injury to head. Redness noted to right knee. Patient was assisted back to bed by staff. Awaiting Knee XR and CT head. Plan of care to continue. Name of Provider notified of fall: [address] notification: Son stated, Jasmine Rose you for letting me know. If possible, can you update me with results if they are concerning. \"    Factors contributing to fall:     Physical: Restlessness.      Psychological: Confused     Environmental: Equipment     Medications received in the past 8 hours:   Medication(s) Administered in past 8 Hours from 12/08/2022 1318 to 12/08/2022 2118     Date/Time Order Dose Route Action Action by Comments    12/08/2022 1447 CST heparin (Porcine) 5000 UNIT/ML injection 5,000 Units 5,000 Units Subcutaneous Given Natalie Marquez RN --    12/08/2022 2043 CST melatonin tab 6 mg 6 mg Oral Given Lige Lodonna, RN --    12/08/2022 2043 CST mirtazapine (Remeron) tab 22.5 mg 22.5 mg Oral Given Lige Loots, RN --    12/08/2022 2043 CST QUEtiapine (SEROquel) tab 150 mg 150 mg Oral Given Lige Lodonna, RN --          Was patient identified as high fall risk prior to fall:         Fall Risk Level: High Fall Risk                      What interventions were in place prior to fall: Bed in lowest position    Interventions post fall: Bed alarm    Additional comments:

## 2022-12-09 NOTE — PLAN OF CARE
Assumed patient care at 7:30. A/Ox1-2. Room air. Normal sinus rhythm on tele. Soft diet, Nectar thin liquids. Meds crushed. Up with 1-2 and a walker. PT evaluated. Pt in chair, wife at bedside. All safety precautions are in place and will continue with plan of care. Problem: SAFETY ADULT - FALL  Goal: Free from fall injury  Description: INTERVENTIONS:  - Assess pt frequently for physical needs  - Identify cognitive and physical deficits and behaviors that affect risk of falls. - [place] fall precautions as indicated by assessment.  - Educate pt/family on patient safety including physical limitations  - Instruct pt to call for assistance with activity based on assessment  - Modify environment to reduce risk of injury  - Provide assistive devices as appropriate  - Consider OT/PT consult to assist with strengthening/mobility  - Encourage toileting schedule  Outcome: Progressing     Problem: CARDIOVASCULAR - ADULT  Goal: Maintains optimal cardiac output and hemodynamic stability  Description: INTERVENTIONS:  - Monitor vital signs, rhythm, and trends  - Monitor for bleeding, hypotension and signs of decreased cardiac output  - Evaluate effectiveness of vasoactive medications to optimize hemodynamic stability  - Monitor arterial and/or venous puncture sites for bleeding and/or hematoma  - Assess quality of pulses, skin color and temperature  - Assess for signs of decreased coronary artery perfusion - ex.  Angina  - Evaluate fluid balance, assess for edema, trend weights  Outcome: Progressing     Problem: Impaired Communication  Goal: Patient will achieve maximal communication potential  Description: Interventions:    Outcome: Progressing

## 2022-12-09 NOTE — PROGRESS NOTES
80 y/o M. A&O to self. RA. VSS. NSR, on tele. Patient denies pain/n/v at this time. Regular diet, soft foods with nectar thick liquids. Meds given per MAR. 2030  PCT doing vitals rounds and found pt on floor on hands and knees, following an unwitnessed fall. Staff was called to room to assist PCT and patient from floor. Patient was returned to be with no incident. Bed alarm was activated. Upon assessing patient, redness to the right knee was noted. No sign of injury to head noted. MD Morrissey notified, CT Head and XR Knee orders placed. Awaiting results. Family notified, would like to be notified if results from either test are abnormal.          Problem: Risk for Violence/Aggression  Goal: Absence of Violence/Aggression  Description: INTERVENTIONS:   - Identify precipitating factors for behavior   - Notify Charge RN/Provider   - Consider decreasing stimulation   - Consider distraction measures   - Consider discussion with provider regarding prn meds    - Consider JOE (Moderate Risk only)   - Consider Code Support (High Risk only)   - Consider room safety checks   - Consider restraints  Outcome: Progressing     Problem: SAFETY ADULT - FALL  Goal: Free from fall injury  Description: INTERVENTIONS:  - Assess pt frequently for physical needs  - Identify cognitive and physical deficits and behaviors that affect risk of falls.   - [place] fall precautions as indicated by assessment.  - Educate pt/family on patient safety including physical limitations  - Instruct pt to call for assistance with activity based on assessment  - Modify environment to reduce risk of injury  - Provide assistive devices as appropriate  - Consider OT/PT consult to assist with strengthening/mobility  - Encourage toileting schedule  12/8/2022 2117 by Johanny Le RN  Outcome: Not Progressing

## 2022-12-09 NOTE — CM/SW NOTE
MSW called and spoke with Adeola Kat at [facility] as family is working on getting medicaid corrected for nursing home benefits. Admissions will contact their insurance  to see if they can see the changes on their end. SW will continue to follow for support and discharge planning to SNF with [facility].     Trey Villaseñor LCSW

## 2022-12-10 VITALS
WEIGHT: 100.88 LBS | TEMPERATURE: 99 F | DIASTOLIC BLOOD PRESSURE: 71 MMHG | OXYGEN SATURATION: 94 % | HEIGHT: 68 IN | SYSTOLIC BLOOD PRESSURE: 120 MMHG | HEART RATE: 79 BPM | RESPIRATION RATE: 17 BRPM | BODY MASS INDEX: 15.29 KG/M2

## 2022-12-10 PROCEDURE — 99217 OBSERVATION CARE DISCHARGE: CPT | Performed by: HOSPITALIST

## 2022-12-10 NOTE — PROGRESS NOTES
NURSING DISCHARGE NOTE    Discharged Nursing home via Ambulance. Accompanied by Family member and Support staff  Belongings Taken by patient/family. Pt discharged to [facility], with residential hospice. IV and tele removed. POLST to be signed at facility. Updated POA wife, and son on code status for trip to  [facility]. Both verbalized understanding. Updated hospice RN. Discharge paperwork taken with.  Report given to [facility]

## 2022-12-10 NOTE — PLAN OF CARE
80 y/o M. A&O to self. RA. VSS. NSR, on tele. Patient denies pain/n/v at this time. Regular diet, soft foods with nectar thick liquids. Meds given per MAR. Patient awaiting hospice placement. Problem: SAFETY ADULT - FALL  Goal: Free from fall injury  Description: INTERVENTIONS:  - Assess pt frequently for physical needs  - Identify cognitive and physical deficits and behaviors that affect risk of falls.   - [place] fall precautions as indicated by assessment.  - Educate pt/family on patient safety including physical limitations  - Instruct pt to call for assistance with activity based on assessment  - Modify environment to reduce risk of injury  - Provide assistive devices as appropriate  - Consider OT/PT consult to assist with strengthening/mobility  - Encourage toileting schedule  Outcome: Progressing     Problem: Delirium  Goal: Minimize duration of delirium  Description: Interventions:  - Encourage use of hearing aids, eye glasses  - Promote highest level of mobility daily  - Provide frequent reorientation  - Promote wakefulness i.e. lights on, blinds open  - Promote sleep, encourage patient's normal rest cycle i.e. lights off, TV off, minimize noise and interruptions  - Encourage family to assist in orientation and promotion of home routines  Outcome: Progressing

## 2022-12-10 NOTE — HOSPICE RN NOTE
Patient is in bed sleeping and no signs/symptoms of distress noted. Wife Charlotta Habermann at bedside. Patient is set to be discharged at 2:30pm today back to [facility]. Packet left in patient's cubby to travel with him in the ambulance. RICHIE Crenshaw notified of packet placement. No further needs or questions at this time.

## 2022-12-10 NOTE — PLAN OF CARE
Assumed care of patient @ 0730. No acute distress noted. A&O to self. VSS. RA. No tele. Regular diet. Soft foods with nectar thick liquids. Bed alarm on. Fall precautions in place. POC: discharge to [facility] with hospice. All needs met at this time. Problem: Risk for Violence/Aggression  Goal: Absence of Violence/Aggression  Description: INTERVENTIONS:   - Identify precipitating factors for behavior   - Notify Charge RN/Provider   - Consider decreasing stimulation   - Consider distraction measures   - Consider discussion with provider regarding prn meds    - Consider JOE (Moderate Risk only)   - Consider Code Support (High Risk only)   - Consider room safety checks   - Consider restraints  Outcome: Progressing  Problem: CARDIOVASCULAR - ADULT  Goal: Maintains optimal cardiac output and hemodynamic stability  Description: INTERVENTIONS:  - Monitor vital signs, rhythm, and trends  - Monitor for bleeding, hypotension and signs of decreased cardiac output  - Evaluate effectiveness of vasoactive medications to optimize hemodynamic stability  - Monitor arterial and/or venous puncture sites for bleeding and/or hematoma  - Assess quality of pulses, skin color and temperature  - Assess for signs of decreased coronary artery perfusion - ex.  Angina  - Evaluate fluid balance, assess for edema, trend weights  Outcome: Progressing  Problem: RESPIRATORY - ADULT  Goal: Achieves optimal ventilation and oxygenation  Description: INTERVENTIONS:  - Assess for changes in respiratory status  - Assess for changes in mentation and behavior  - Position to facilitate oxygenation and minimize respiratory effort  - Oxygen supplementation based on oxygen saturation or ABGs  - Provide Smoking Cessation handout, if applicable  - Encourage broncho-pulmonary hygiene including cough, deep breathe, Incentive Spirometry  - Assess the need for suctioning and perform as needed  - Assess and instruct to report SOB or any respiratory difficulty  - Respiratory Therapy support as indicated  - Manage/alleviate anxiety  - Monitor for signs/symptoms of CO2 retention  Outcome: Progressing  Problem: PAIN - ADULT  Goal: Verbalizes/displays adequate comfort level or patient's stated pain goal  Description: INTERVENTIONS:  - Encourage pt to monitor pain and request assistance  - Assess pain using appropriate pain scale  - Administer analgesics based on type and severity of pain and evaluate response  - Implement non-pharmacological measures as appropriate and evaluate response  - Consider cultural and social influences on pain and pain management  - Manage/alleviate anxiety  - Utilize distraction and/or relaxation techniques  - Monitor for opioid side effects  - Notify MD/LIP if interventions unsuccessful or patient reports new pain  - Anticipate increased pain with activity and pre-medicate as appropriate  Outcome: Progressing  Problem: SAFETY ADULT - FALL  Goal: Free from fall injury  Description: INTERVENTIONS:  - Assess pt frequently for physical needs  - Identify cognitive and physical deficits and behaviors that affect risk of falls.   - [place] fall precautions as indicated by assessment.  - Educate pt/family on patient safety including physical limitations  - Instruct pt to call for assistance with activity based on assessment  - Modify environment to reduce risk of injury  - Provide assistive devices as appropriate  - Consider OT/PT consult to assist with strengthening/mobility  - Encourage toileting schedule  Outcome: Progressing     Problem: Patient/Family Goals  Goal: Patient/Family Long Term Goal  Description: Patient's Long Term Goal: Go to [facility]  Interventions:  - hospice  - See additional Care Plan goals for specific interventions  Outcome: Progressing  Goal: Patient/Family Short Term Goal  Description: Patient's Short Term Goal: pain management  Interventions:   - prn per [facility]  hospitalist  - See additional Care Plan goals for specific interventions  Outcome: Progressing  Problem: GASTROINTESTINAL - ADULT  Goal: Maintains adequate nutritional intake (undernourished)  Description: INTERVENTIONS:  - Monitor percentage of each meal consumed  - Identify factors contributing to decreased intake, treat as appropriate  - Assist with meals as needed  - Monitor I&O, WT and lab values  - Obtain nutritional consult as needed  - Optimize oral hygiene and moisture  - Encourage food from home; allow for food preferences  - Enhance eating environment  Outcome: Progressing  Problem: Impaired Communication  Goal: Patient will achieve maximal communication potential  Description: Interventions:  Outcome: Progressing  Problem: Safety Risk - Non-Violent Restraints  Goal: Patient will remain free from self-harm  Description: INTERVENTIONS:  - Apply the least restrictive restraint to prevent harm  - Notify patient and family of reasons restraints applied  - Assess for any contributing factors to confusion (electrolyte disturbances, delirium, medications)  - Discontinue any unnecessary medical devices as soon as possible  - Assess the patient's physical comfort, circulation, skin condition, hydration, nutrition and elimination needs   - Reorient and redirection as needed  - Assess for the need to continue restraints  Outcome: Progressing  Problem: Delirium  Goal: Minimize duration of delirium  Description: Interventions:  - Encourage use of hearing aids, eye glasses  - Promote highest level of mobility daily  - Provide frequent reorientation  - Promote wakefulness i.e. lights on, blinds open  - Promote sleep, encourage patient's normal rest cycle i.e. lights off, TV off, minimize noise and interruptions  - Encourage family to assist in orientation and promotion of home routines  Outcome: Progressing

## 2022-12-15 ENCOUNTER — APPOINTMENT (OUTPATIENT)
Dept: CT IMAGING | Facility: HOSPITAL | Age: 82
End: 2022-12-15
Attending: STUDENT IN AN ORGANIZED HEALTH CARE EDUCATION/TRAINING PROGRAM
Payer: COMMERCIAL

## 2022-12-15 ENCOUNTER — HOSPITAL ENCOUNTER (EMERGENCY)
Facility: HOSPITAL | Age: 82
Discharge: HOME | End: 2022-12-15
Attending: STUDENT IN AN ORGANIZED HEALTH CARE EDUCATION/TRAINING PROGRAM
Payer: COMMERCIAL

## 2022-12-15 VITALS
SYSTOLIC BLOOD PRESSURE: 132 MMHG | DIASTOLIC BLOOD PRESSURE: 73 MMHG | OXYGEN SATURATION: 97 % | HEART RATE: 93 BPM | TEMPERATURE: 99 F | RESPIRATION RATE: 17 BRPM

## 2022-12-15 DIAGNOSIS — S09.90XA TRAUMATIC INJURY OF HEAD, INITIAL ENCOUNTER: Primary | ICD-10-CM

## 2022-12-15 LAB — SARS-COV-2 RNA RESP QL NAA+PROBE: NOT DETECTED

## 2022-12-15 PROCEDURE — 99284 EMERGENCY DEPT VISIT MOD MDM: CPT

## 2022-12-15 PROCEDURE — 70450 CT HEAD/BRAIN W/O DYE: CPT | Performed by: STUDENT IN AN ORGANIZED HEALTH CARE EDUCATION/TRAINING PROGRAM

## 2022-12-15 PROCEDURE — 72125 CT NECK SPINE W/O DYE: CPT | Performed by: STUDENT IN AN ORGANIZED HEALTH CARE EDUCATION/TRAINING PROGRAM

## 2022-12-16 NOTE — ED INITIAL ASSESSMENT (HPI)
Pt arrived via EMS after unwitnessed fall. Per EMS pt had just been given ativan and then was found on floor by staff 10 minutes later. Pt is alert to self baseline. Lethargic.

## 2022-12-16 NOTE — ED QUICK NOTES
Voicemail left with hospice company to call back for update. Voicemail left with chito sparks to call back regarding status of pt.

## (undated) NOTE — IP AVS SNAPSHOT
[address]            (For Outpatient Use Only) Initial Admit Date: 2022   Inpt/Obs Admit Date: Inpt: N/A / Obs: 22   Discharge Date:    Hospital Acct:  [de-identified]   MRN: [de-identified]   CSN: [id number]   CEID: [id number]        ENCOUNTER  Patient Class: Observation Admitting Provider: Mahamed Lauren MD Unit: [address] Service: Med/Behavioral Attending Provider: Anjel Starks DO   Bed: 2230-S   Visit Type:   Referring Physician: No ref. provider found Billing Flag:    Admit Diagnosis: Alzheimer's dementia with behavioral disturbance ([facility] [address] [G30.9, G52.743]      PATIENT  Legal Name:   Blayne Vasquez   Legal Sex: Male  Gender ID: Male             [address] [facility] Name:    PCP:  Tommie Hernandze [address] [facility] [address]: [phone]   Address:  [address] [place] [address] : 1940 (82 yrs) Mobile: [phone]         City/State/Zip: [address] [place] [address], [address] [place] [address] Marital:  Language: [place] city: [employer] [facility] [employer] SSN4: [id number] Nondenominational: [address] Not [place]*     Race: Other Ethnicity:  Or  [facility] ([facility]   EMERGENCY CONTACT   Name Relationship Legal Guardian? Home Phone Work Phone Mobile Phone   1. BEV OSORIO JR  2. [facility] Patel Ribeiro Casey          [phone]     GUARANTOR  Guarantor: Venessa Shahid : 1940 Home Phone: [phone]   Address: [address]  Sex:  Male Work Phone:    City/State/Zip: [address] [place] [address], [address] [place] [address]   Rel. to Patient: Self Guarantor ID: [id number]   [address]   Employer:  Status: RETIRED     COVERAGE  PRIMARY INSURANCE   Payor: SAVI Ch MONCHO   Group Number: [id number] Insurance Type: [address] Name: Tisha Tobias : 1940   Subscriber ID: [id number] Pt Rel to Subscriber: Self   SECONDARY INSURANCE   Payor:  Plan:    Group Number:  Insurance Type:    Subscriber Name:  Subscriber :    Subscriber ID:  Pt Rel to Subscriber:    TERTIARY INSURANCE   Payor:  Plan: Group Number:  Insurance Type:    Subscriber Name:  Subscriber :    Subscriber ID:  Pt Rel to Subscriber:    Hospital Account Financial Class: Managed Care    December 10, 2022